# Patient Record
Sex: MALE | Employment: FULL TIME | URBAN - METROPOLITAN AREA
[De-identification: names, ages, dates, MRNs, and addresses within clinical notes are randomized per-mention and may not be internally consistent; named-entity substitution may affect disease eponyms.]

---

## 2017-02-22 ENCOUNTER — OFFICE VISIT (OUTPATIENT)
Dept: FAMILY MEDICINE | Facility: CLINIC | Age: 62
End: 2017-02-22
Payer: OTHER GOVERNMENT

## 2017-02-22 VITALS
HEART RATE: 76 BPM | SYSTOLIC BLOOD PRESSURE: 111 MMHG | WEIGHT: 276 LBS | DIASTOLIC BLOOD PRESSURE: 79 MMHG | RESPIRATION RATE: 16 BRPM | HEIGHT: 72 IN | OXYGEN SATURATION: 95 % | TEMPERATURE: 98 F | BODY MASS INDEX: 37.38 KG/M2

## 2017-02-22 DIAGNOSIS — I10 BENIGN ESSENTIAL HYPERTENSION: ICD-10-CM

## 2017-02-22 DIAGNOSIS — E06.3 HYPOTHYROIDISM DUE TO HASHIMOTO'S THYROIDITIS: Primary | ICD-10-CM

## 2017-02-22 DIAGNOSIS — Z11.59 NEED FOR HEPATITIS C SCREENING TEST: ICD-10-CM

## 2017-02-22 LAB
ANION GAP SERPL CALCULATED.3IONS-SCNC: 5 MMOL/L (ref 3–14)
BUN SERPL-MCNC: 14 MG/DL (ref 7–30)
CALCIUM SERPL-MCNC: 8.8 MG/DL (ref 8.5–10.1)
CHLORIDE SERPL-SCNC: 107 MMOL/L (ref 94–109)
CHOLEST SERPL-MCNC: 177 MG/DL
CO2 SERPL-SCNC: 28 MMOL/L (ref 20–32)
CREAT SERPL-MCNC: 1.03 MG/DL (ref 0.66–1.25)
GFR SERPL CREATININE-BSD FRML MDRD: 74 ML/MIN/1.7M2
GLUCOSE SERPL-MCNC: 97 MG/DL (ref 70–99)
HCV AB SERPL QL IA: NORMAL
HDLC SERPL-MCNC: 40 MG/DL
LDLC SERPL CALC-MCNC: 121 MG/DL
NONHDLC SERPL-MCNC: 137 MG/DL
POTASSIUM SERPL-SCNC: 4.3 MMOL/L (ref 3.4–5.3)
SODIUM SERPL-SCNC: 140 MMOL/L (ref 133–144)
TRIGL SERPL-MCNC: 78 MG/DL
TSH SERPL DL<=0.05 MIU/L-ACNC: 0.7 MU/L (ref 0.4–4)

## 2017-02-22 PROCEDURE — 86803 HEPATITIS C AB TEST: CPT | Performed by: FAMILY MEDICINE

## 2017-02-22 PROCEDURE — 84443 ASSAY THYROID STIM HORMONE: CPT | Performed by: FAMILY MEDICINE

## 2017-02-22 PROCEDURE — 80048 BASIC METABOLIC PNL TOTAL CA: CPT | Performed by: FAMILY MEDICINE

## 2017-02-22 PROCEDURE — 99214 OFFICE O/P EST MOD 30 MIN: CPT | Performed by: FAMILY MEDICINE

## 2017-02-22 PROCEDURE — 80061 LIPID PANEL: CPT | Performed by: FAMILY MEDICINE

## 2017-02-22 PROCEDURE — 36415 COLL VENOUS BLD VENIPUNCTURE: CPT | Performed by: FAMILY MEDICINE

## 2017-02-22 RX ORDER — NIFEDIPINE 60 MG/1
60 TABLET, EXTENDED RELEASE ORAL DAILY
Qty: 90 TABLET | Refills: 3 | Status: SHIPPED | OUTPATIENT
Start: 2017-02-22 | End: 2017-03-28

## 2017-02-22 RX ORDER — LEVOTHYROXINE SODIUM 137 UG/1
1 CAPSULE ORAL DAILY
Qty: 90 CAPSULE | Refills: 3 | Status: SHIPPED | OUTPATIENT
Start: 2017-02-22 | End: 2017-03-25

## 2017-02-22 NOTE — NURSING NOTE
Chief Complaint   Patient presents with     Recheck Medication     Levothyroxine     Refill Request     Initial /79 (BP Location: Right arm, Patient Position: Chair, Cuff Size: Adult Large)  Pulse 76  Temp 98  F (36.7  C) (Oral)  Resp 16  Ht 6' (1.829 m)  Wt 276 lb (125.2 kg)  SpO2 95%  BMI 37.43 kg/m2 Estimated body mass index is 37.43 kg/(m^2) as calculated from the following:    Height as of this encounter: 6' (1.829 m).    Weight as of this encounter: 276 lb (125.2 kg)..  BP completed using cuff size large  Kelsi Villanueva, CMA

## 2017-02-22 NOTE — MR AVS SNAPSHOT
After Visit Summary   2/22/2017    Simba Fuentes    MRN: [id number]           Patient Information     Date Of Birth          1955        Visit Information        Provider Department      2/22/2017 8:15 AM Kvng Malhotra MD [facility]        Today's Diagnoses     Hypothyroidism due to Hashimoto's thyroiditis    -  1    Benign essential hypertension        Need for hepatitis C screening test           Follow-ups after your visit        Your next 10 appointments already scheduled     Jun 08, 2017 10:00 AM CDT   CT ABDOMEN PELVIS W CONTRAST with [id number]   [facility] ([facility])    [address]  [facility] [id number] [phone]           Please bring any scans or X-rays taken at other hospitals, if similar tests were done. Also bring a list of your medicines, including vitamins, minerals and over-the-counter drugs. It is safest to leave personal items at home.  Be sure to tell your doctor:   If you have any allergies.   If there s any chance you are pregnant.   If you are breastfeeding.   If you have any special needs.  You may have contrast for this exam. To prepare:   Do not eat or drink for 2 hours before your exam. If you need to take medicine, you may take it with small sips of water. (We may ask you to take liquid medicine as well.)   The day before your exam, drink extra fluids at least six 8-ounce glasses (unless your doctor tells you to restrict your fluids).  Patients over 70 or patients with diabetes or kidney problems:   If you haven t had a blood test (creatinine test) within the last 30 days, go to your clinic or Diagnostic Imaging Department for this test.  If you have diabetes:   If your kidney function is normal, continue taking your metformin (Avandamet, Glucophage, Glucovance, Metaglip) on the day of your exam.   If your kidney function is abnormal, wait 48 hours before restarting this  medicine.  You will have oral contrast for this exam:   You will drink the contrast at home. Get this from your clinic or Diagnostic Imaging Department. Please follow the directions given.  Please wear loose clothing, such as a sweat suit or jogging clothes. Avoid snaps, zippers and other metal. We may ask you to undress and put on a hospital gown.  If you have any questions, please call the Imaging Department where you will have your exam.            Jun 08, 2017 10:30 AM CDT   CT CHEST W CONTRAST with [address] [facility] ([facility])    [address]  [place] [address] [phone]           Please bring any scans or X-rays taken at other hospitals, if similar tests were done. Also bring a list of your medicines, including vitamins, minerals and over-the-counter drugs. It is safest to leave personal items at home.  Be sure to tell your doctor:   If you have any allergies.   If there s any chance you are pregnant.   If you are breastfeeding.   If you have any special needs.  You will have contrast for this exam. To prepare:   Do not eat or drink for 2 hours before your exam. If you need to take medicine, you may take it with small sips of water. (We may ask you to take liquid medicine as well.)   The day before your exam, drink extra fluids at least six 8-ounce glasses (unless your doctor tells you to restrict your fluids).  Patients over 70 or patients with diabetes or kidney problems:   If you haven t had a blood test (creatinine test) within the last 30 days, go to your clinic or Diagnostic Imaging Department for this test.  If you have diabetes:   If your kidney function is normal, continue taking your metformin (Avandamet, Glucophage, Glucovance, Metaglip) on the day of your exam.   If your kidney function is abnormal, wait 48 hours before restarting this medicine.  Please wear loose clothing, such as a sweat suit or jogging clothes.  "Avoid snaps, zippers and other metal. We may ask you to undress and put on a hospital gown.  If you have any questions, please call the Imaging Department where you will have your exam.              Who to contact     If you have questions or need follow up information about today's clinic visit or your schedule please contact [facility] directly at [phone].  Normal or non-critical lab and imaging results will be communicated to you by MyChart, letter or phone within 4 business days after the clinic has received the results. If you do not hear from us within 7 days, please contact the clinic through [employer] or phone. If you have a critical or abnormal lab result, we will notify you by phone as soon as possible.  Submit refill requests through [employer] or call your pharmacy and they will forward the refill request to us. Please allow 3 business days for your refill to be completed.          Additional Information About Your Visit        [employer] Information     [employer] lets you send messages to your doctor, view your test results, renew your prescriptions, schedule appointments and more. To sign up, go to [email or web address] . Click on \"Log in\" on the left side of the screen, which will take you to the Welcome page. Then click on \"Sign up Now\" on the right side of the page.     You will be asked to enter the access code listed below, as well as some personal information. Please follow the directions to create your username and password.     Your access code is: [email or web address]  Expires: 2017  8:51 AM     Your access code will  in 90 days. If you need help or a new code, please call your [place] clinic or [phone].        Care EveryWhere ID     This is your Care EveryWhere ID. This could be used by other organizations to access your [place] medical records  [id number]        Your Vitals Were     Pulse Temperature Respirations Height Pulse Oximetry BMI (Body Mass Index)    76 " 98  F (36.7  C) (Oral) 16 6' (1.829 m) 95% 37.43 kg/m2       Blood Pressure from Last 3 Encounters:   02/22/17 111/79   05/16/16 107/77   04/29/16 117/89    Weight from Last 3 Encounters:   02/22/17 276 lb (125.2 kg)   04/29/16 282 lb (127.9 kg)   07/28/15 272 lb 14.4 oz (123.8 kg)              We Performed the Following     Basic metabolic panel     Hepatitis C Screen Reflex to HCV RNA Quant and Genotype     Lipid Profile with reflex to direct LDL     TSH          Where to get your medicines      These medications were sent to [place] [address] [facility] - [place], [place] - [address]  [address], [facility] [address]     Phone:  [phone]     Levothyroxine Sodium 137 MCG Caps          Primary Care Provider Office Phone # Fax #    Kvng Malhotra [phone]       [facility] [id number] [place] [id number]        Thank you!     Thank you for choosing [facility]  for your care. Our goal is always to provide you with excellent care. Hearing back from our patients is one way we can continue to improve our services. Please take a few minutes to complete the written survey that you may receive in the mail after your visit with us. Thank you!             Your Updated Medication List - Protect others around you: Learn how to safely use, store and throw away your medicines at www.disposemymeds.org.          This list is accurate as of: 2/22/17  8:51 AM.  Always use your most recent med list.                   Brand Name Dispense Instructions for use    FISH OIL          Levothyroxine Sodium 137 MCG Caps     90 capsule    Take 1 tablet by mouth daily       NIFEdipine ER osmotic 60 MG Tb24    PROCARDIA XL    90 tablet    Take 1 tablet (60 mg) by mouth daily

## 2017-02-22 NOTE — PROGRESS NOTES
SUBJECTIVE:                                                    Simba Fuentes is a 61 year old male who presents to clinic today for the following health issues:      Hypothyroidism Follow-up      Since last visit, patient describes the following symptoms: Weight stable, no hair loss, no skin changes, no constipation, no loose stools       Amount of exercise or physical activity: 4-5 days/week for an average of greater than 60 minutes    Problems taking medications regularly: No    Medication side effects: none  Diet: regular (no restrictions)    Hypertension Follow-up      Outpatient blood pressures are not being checked.    Low Salt Diet: no added salt       Problem list and histories reviewed & adjusted, as indicated.  Additional history: as documented    Patient Active Problem List   Diagnosis     CARDIOVASCULAR SCREENING; LDL GOAL LESS THAN 160     Advanced directives, counseling/discussion     Seborrheic keratosis     Obesity     Nephrolithiasis     Ascending aorta enlargement (H)     Thyroid nodule     Seasonal allergic rhinitis     High grade B-cell lymphoma (H)     Renal cyst     Appendicitis     Hypothyroidism due to Hashimoto's thyroiditis     Benign essential hypertension     Past Surgical History   Procedure Laterality Date     Tonsillectomy & adenoidectomy  age 12      Hemrrhoid  1994     Insert port vascular access  11/18/2013     Procedure: INSERT PORT VASCULAR ACCESS;  Bilateral Hip bone marrow biopsies, Left thyroid biopsy, pretracheal lymph node biopsy,  Insertion port-a-cath left chest;  Surgeon: Lucila Tian MD;  Location: [facility] OR     Bone marrow biopsy, bone specimen, needle/trocar  11/18/2013     Procedure: BIOPSY BONE MARROW;;  Surgeon: Arthur Henry MD;  Location: [facility] OR     Thyroidectomy  11/18/2013     Procedure: THYROIDECTOMY;;  Surgeon: Lucila Tian MD;  Location: [facility] OR     Laparoscopic appendectomy N/A 7/28/2015     Procedure: LAPAROSCOPIC APPENDECTOMY;  Surgeon:  Lucila Tian MD;  Location: [facility] OR     Remove port vascular access Left 7/28/2015     Procedure: REMOVE PORT VASCULAR ACCESS;  Surgeon: Lucila Tian MD;  Location: [facility] OR     Abdomen surgery       Biopsy         Social History   Substance Use Topics     Smoking status: Never Smoker     Smokeless tobacco: Never Used     Alcohol use No     Family History   Problem Relation Age of Onset     CEREBROVASCULAR DISEASE Father      Hypertension Father      GASTROINTESTINAL DISEASE Mother      liver issues     Gallbladder Disease Mother      Eye Disorder Sister      Gallbladder Disease Maternal Grandmother      Gallbladder Disease Sister          Current Outpatient Prescriptions   Medication Sig Dispense Refill     Levothyroxine Sodium 137 MCG CAPS Take 1 tablet by mouth daily 90 capsule 3     NIFEdipine ER osmotic (PROCARDIA XL) 60 MG TB24 Take 1 tablet (60 mg) by mouth daily 90 tablet 3     FISH OIL        [DISCONTINUED] NIFEdipine ER osmotic (PROCARDIA XL) 60 MG TB24 Take 1 tablet (60 mg) by mouth daily 90 tablet 3     [DISCONTINUED] Levothyroxine Sodium 137 MCG CAPS Take 1 tablet by mouth daily        Allergies   Allergen Reactions     Chlorhexidine Rash     Chloraprep - red itchy rash.  Patient report.       ROS:  C: NEGATIVE for fever, chills, change in weight  CV: NEGATIVE for chest pain, palpitations or peripheral edema    OBJECTIVE:                                                    /79 (BP Location: Right arm, Patient Position: Chair, Cuff Size: Adult Large)  Pulse 76  Temp 98  F (36.7  C) (Oral)  Resp 16  Ht 6' (1.829 m)  Wt 276 lb (125.2 kg)  SpO2 95%  BMI 37.43 kg/m2  Body mass index is 37.43 kg/(m^2).  GENERAL: healthy, alert and no distress  NECK: no adenopathy, no asymmetry, masses, or scars and thyroid normal to palpation  RESP: lungs clear to auscultation - no rales, rhonchi or wheezes  CV: regular rate and rhythm, normal S1 S2, no S3 or S4, no murmur, click or rub, no peripheral edema and  peripheral pulses strong  ABDOMEN: soft, nontender, no hepatosplenomegaly, no masses and bowel sounds normal  MS: no gross musculoskeletal defects noted, no edema         ASSESSMENT/PLAN:                                                            1. Hypothyroidism due to Hashimoto's thyroiditis  Well controlled, check labs  - Levothyroxine Sodium 137 MCG CAPS; Take 1 tablet by mouth daily  Dispense: 90 capsule; Refill: 3  - TSH    2. Benign essential hypertension  Controlled, talked about weight loss.  - Basic metabolic panel  - Lipid Profile with reflex to direct LDL  - NIFEdipine ER osmotic (PROCARDIA XL) 60 MG TB24; Take 1 tablet (60 mg) by mouth daily  Dispense: 90 tablet; Refill: 3    3. Need for hepatitis C screening test    - Hepatitis C Screen Reflex to HCV RNA Quant and Genotype    FUTURE APPOINTMENTS:       - Follow-up visit in 1 year.,    Kvng Malhotra MD  [facility]

## 2017-02-23 ENCOUNTER — TELEPHONE (OUTPATIENT)
Dept: FAMILY MEDICINE | Facility: CLINIC | Age: 62
End: 2017-02-23

## 2017-02-23 NOTE — TELEPHONE ENCOUNTER
Message given to pt  Pt would prefer not to start medication for lipids  Discussed heart risk, diet and increasing exercise, encouraged 20-30 min exercise 5 days a week  Kalyani Moreno RN, BS  Clinical Nurse Triage .

## 2017-02-23 NOTE — TELEPHONE ENCOUNTER
Given his age, HTN and cholesterol, it is recommended to be on statin drugs like zocor 40 or lipitor 40.  Is he willing to do that?  Heart risk is 8.5%.  Kvng Malhotra MD  [facility]  [phone]

## 2017-03-25 DIAGNOSIS — I10 BENIGN ESSENTIAL HYPERTENSION: ICD-10-CM

## 2017-03-25 DIAGNOSIS — E06.3 HYPOTHYROIDISM DUE TO HASHIMOTO'S THYROIDITIS: ICD-10-CM

## 2017-03-28 RX ORDER — NIFEDIPINE 60 MG/1
60 TABLET, EXTENDED RELEASE ORAL DAILY
Qty: 90 TABLET | Refills: 3 | Status: SHIPPED | OUTPATIENT
Start: 2017-03-28 | End: 2018-04-04

## 2017-03-28 RX ORDER — LEVOTHYROXINE SODIUM 137 UG/1
1 CAPSULE ORAL DAILY
Qty: 90 CAPSULE | Refills: 3 | Status: SHIPPED | OUTPATIENT
Start: 2017-03-28 | End: 2018-04-04

## 2017-03-28 NOTE — TELEPHONE ENCOUNTER
Pt calls, requests these prescriptions transferred to [facility] mail order pharmacy.   Signed Prescriptions:                        Disp   Refills    Levothyroxine Sodium 137 MCG CAPS          90 cap*3        Sig: Take 1 tablet by mouth daily  Authorizing Provider: VAMSHI, AMER  Ordering User: ROSEMARIE YU    NIFEdipine ER osmotic (PROCARDIA XL) 60 MG*90 tab*3        Sig: Take 1 tablet (60 mg) by mouth daily  Authorizing Provider: VAMSHI, AMER  Ordering User: ROSEMARIE YU    Informed done.   Rosemarie Yu, LIZ

## 2017-03-30 ENCOUNTER — TELEPHONE (OUTPATIENT)
Dept: CARDIOLOGY | Facility: CLINIC | Age: 62
End: 2017-03-30

## 2017-03-30 DIAGNOSIS — I71.21 ASCENDING AORTIC ANEURYSM (H): Primary | ICD-10-CM

## 2017-03-30 NOTE — TELEPHONE ENCOUNTER
Pt called and left a vm stating that he has scheduled a follow up with Dr. Gayle in June. Pt states that it was recommended that he get a CT scan of chest, abdomen, and pelvis done prior to this OV. Pt states that he does not want to do this CT scan because of the radiation exposure. Pt was wondering if Dr. Gayle would like to order an ultrasound instead to follow up    Chart reviewed: 5/16/16 OV with Dr. Gayle     1. Ascending aortic aneurysm, 4.8 cm    Continue to follow. Patient will be getting another CT scan for his non-Hodgkin's lipoma in approximately one year.    Would consider changing his nifedipine to either a beta blocker or a combination of beta blocker and ACE inhibitor if any further growth. To my measurement, I do not feel there has been significant enlargement in the past three years going from 4.7 cm to 4.8 cm when reviewing the images directly.     Follow-up in one year, Tesfaye with me.    Writer will route to Dr. Gayle to review

## 2017-03-31 NOTE — TELEPHONE ENCOUNTER
Do we know who ordered the CT? If I ordered it, we can do an echo instead to check the aorta. If it was ordered by his oncologist to follow his lymphoma, he should check with them before canceling it.

## 2017-03-31 NOTE — TELEPHONE ENCOUNTER
Spoke with patient. Informed him that his oncologist ordered the CT scan.  He stated he knew but he is cancelling the CT with his oncologist but he wanted to make sure Dr. Gayle was aware due to the Ascending aortic aneurysm.  He stated he was told at his last OV that if he has a CT Dr. Gayle can see the aorta then but if he does not have the CT Dr. Gayle would order an echo. Patient stated he would like to have the echo completed. Order placed and sent to scheduling to schedule.

## 2017-04-04 ENCOUNTER — TELEPHONE (OUTPATIENT)
Dept: FAMILY MEDICINE | Facility: CLINIC | Age: 62
End: 2017-04-04

## 2017-04-04 NOTE — TELEPHONE ENCOUNTER
Recd 1 page fax from [employer].  Pleaes sign form and fax to [phone].  Form in AA folder at [place].    Yanna Root

## 2017-04-26 ENCOUNTER — HOSPITAL ENCOUNTER (OUTPATIENT)
Dept: CARDIOLOGY | Facility: CLINIC | Age: 62
Discharge: HOME | End: 2017-04-26
Attending: INTERNAL MEDICINE | Admitting: INTERNAL MEDICINE
Payer: OTHER GOVERNMENT

## 2017-04-26 DIAGNOSIS — I71.21 ASCENDING AORTIC ANEURYSM (H): ICD-10-CM

## 2017-04-26 PROCEDURE — 93306 TTE W/DOPPLER COMPLETE: CPT | Mod: 26 | Performed by: INTERNAL MEDICINE

## 2017-04-26 PROCEDURE — 93306 TTE W/DOPPLER COMPLETE: CPT

## 2017-05-02 ENCOUNTER — TELEPHONE (OUTPATIENT)
Dept: CARDIOLOGY | Facility: CLINIC | Age: 62
End: 2017-05-02

## 2017-05-02 NOTE — TELEPHONE ENCOUNTER
"Called patient to update him of the that Dr. Gayle reviewed the echo \"The echo measurements of the aortic root and ascending aorta are pretty stable and still in the moderate range. Will discuss further at OV in June.\" patient pleased and he will present in June to the office visit.   "

## 2017-06-12 ENCOUNTER — TRANSFERRED RECORDS (OUTPATIENT)
Dept: CARDIOLOGY | Facility: CLINIC | Age: 62
End: 2017-06-12

## 2017-06-12 LAB
ALBUMIN SERPL-MCNC: 4 G/DL
ALP SERPL-CCNC: 54 U/L
ALT SERPL-CCNC: 20 U/L
ANION GAP SERPL CALCULATED.3IONS-SCNC: NORMAL MMOL/L
AST SERPL-CCNC: 19 U/L
BILIRUB SERPL-MCNC: 0.5 MG/DL
BUN SERPL-MCNC: 17 MG/DL
CALCIUM SERPL-MCNC: 9.2 MG/DL
CHLORIDE SERPLBLD-SCNC: 105 MMOL/L
CO2 SERPL-SCNC: 23 MMOL/L
CREAT SERPL-MCNC: 1.1 MG/DL
ERYTHROCYTE [DISTWIDTH] IN BLOOD BY AUTOMATED COUNT: 13.9 %
GFR SERPL CREATININE-BSD FRML MDRD: >60 ML/MIN/1.73M2
GLUCOSE SERPL-MCNC: 87 MG/DL (ref 70–99)
HCT VFR BLD AUTO: 37.9 %
HEMOGLOBIN: 13.3 G/DL
MCH RBC QN AUTO: 31.3 PG
MCHC RBC AUTO-ENTMCNC: 35.1 G/DL
MCV RBC AUTO: 89.2 FL
PLATELET # BLD AUTO: 202 10^9/L
POTASSIUM SERPL-SCNC: 4 MMOL/L
PROT SERPL-MCNC: 7.5 G/DL
RBC # BLD AUTO: 4.25 10^12/L
SODIUM SERPL-SCNC: 138 MMOL/L
WBC # BLD AUTO: 5.1 10^9/L

## 2017-06-16 ENCOUNTER — TRANSFERRED RECORDS (OUTPATIENT)
Dept: CARDIOLOGY | Facility: CLINIC | Age: 62
End: 2017-06-16

## 2017-06-16 ENCOUNTER — TRANSFERRED RECORDS (OUTPATIENT)
Dept: SURGERY | Facility: CLINIC | Age: 62
End: 2017-06-16

## 2017-06-19 ENCOUNTER — TELEPHONE (OUTPATIENT)
Dept: CARDIOLOGY | Facility: CLINIC | Age: 62
End: 2017-06-19

## 2017-06-20 ENCOUNTER — OFFICE VISIT (OUTPATIENT)
Dept: CARDIOLOGY | Facility: CLINIC | Age: 62
End: 2017-06-20
Attending: INTERNAL MEDICINE
Payer: OTHER GOVERNMENT

## 2017-06-20 VITALS
WEIGHT: 267 LBS | HEIGHT: 72 IN | DIASTOLIC BLOOD PRESSURE: 77 MMHG | OXYGEN SATURATION: 97 % | SYSTOLIC BLOOD PRESSURE: 112 MMHG | BODY MASS INDEX: 36.16 KG/M2 | HEART RATE: 72 BPM

## 2017-06-20 DIAGNOSIS — I71.21 THORACIC ASCENDING AORTIC ANEURYSM (H): ICD-10-CM

## 2017-06-20 PROCEDURE — 99213 OFFICE O/P EST LOW 20 MIN: CPT | Performed by: INTERNAL MEDICINE

## 2017-06-20 NOTE — PROGRESS NOTES
Cardiology Progress Note          Assessment and Plan:     1. Ascending aortic aneurysm, stable at 4.6 cm on echocardiogram, 4.8 cm on CT.    Excellent blood pressure control.  It has been stable over the past few years.  Recheck echo in one year and follow up in clinic in two years.    This note was transcribed using electronic voice recognition software and there may be typographical errors present.                Interval History:   The patient is a very pleasant 61 year old patient of Dr. Malhotra and Dr. Ramirez.  He has non-Hodgkin's lymphoma that is currently stable after treatment.  He declined radiation and just had chemotherapy.  He did have previous tonsillar radiation approximately 45 years ago.  He currently has hypertension that is very well controlled on nifedipine.  He has not been on a beta blocker.     He has been followed for ascending aortic aneurysm in the setting of a normal trileaflet aortic valve.     He gets frequent CT scans due to his history of non-Hodgkin's lymphoma and I have reviewed the images of these tests.  His most recent scan on 5/2/2016 I had compared directly to a previous one in 2013.   On that scan, his ascending thoracic aorta was approximately 4.8 cm.  It is tubular rather than focal and appears very similar to 2013 where I measured it to be 4.7 cm at the same level.  He has had different measurements that may have been somewhat suboptimal including transthoracic echo that measured 4.5 cm around that time.    Most recent transthoracic echocardiogram in 2017 shows stability at 4.6 cm.  I have personally reviewed the images.      Patient denies any exertional chest discomfort or dyspnea on exertion.  He has an 18 month old granddaughter that he takes care of, but when he is not with her he does bicycle 10-15 miles per day about any dyspnea or chest discomfort.                       Review of Systems:   Review of Systems:  Skin:  Negative     Eyes:  Positive for glasses  ENT:   Negative    Respiratory:  Negative    Cardiovascular:  Negative    Gastroenterology: Negative    Genitourinary:  Negative    Musculoskeletal:  Negative    Neurologic:  Positive for numbness or tingling of feet  Psychiatric:  Negative    Heme/Lymph/Imm:  Positive for easy bruising  Endocrine:  Positive for thyroid disorder              Physical Exam:     Vitals: /77 (BP Location: Right arm, Patient Position: Sitting, Cuff Size: Adult Large)  Pulse 72  Ht 1.829 m (6')  Wt 121.1 kg (267 lb)  SpO2 97%  BMI 36.21 kg/m2  Constitutional:  cooperative, alert and oriented, well developed, well nourished, in no acute distress        Skin:  warm and dry to the touch, no apparent skin lesions or masses noted        Head:  normocephalic, no masses or lesions        Eyes:  pupils equal and round, conjunctivae and lids unremarkable, sclera white, no xanthalasma, EOMS intact, no nystagmus        ENT:  no pallor or cyanosis, dentition good        Neck:  JVP normal        Chest:  normal breath sounds, clear to auscultation, normal A-P diameter, normal symmetry, normal respiratory excursion, no use of accessory muscles        Cardiac: regular rhythm frequent premature beats                Abdomen:      benign    Extremities and Back:  no deformities, clubbing, cyanosis, erythema observed        Neurological:  affect appropriate, oriented to time, person and place;no gross motor deficits                 Medications:     Current Outpatient Prescriptions   Medication Sig Dispense Refill     Levothyroxine Sodium 137 MCG CAPS Take 1 tablet by mouth daily 90 capsule 3     NIFEdipine ER osmotic (PROCARDIA XL) 60 MG TB24 Take 1 tablet (60 mg) by mouth daily 90 tablet 3     FISH OIL                   Data:   All laboratory data reviewed  No results found for this or any previous visit (from the past 24 hour(s)).    All laboratory data reviewed  Lab Results   Component Value Date    CHOL 177 02/22/2017     Lab Results   Component  Value Date    HDL 40 02/22/2017     Lab Results   Component Value Date     02/22/2017     Lab Results   Component Value Date    TRIG 78 02/22/2017     Lab Results   Component Value Date    CHOLHDLRATIO 3.7 09/05/2014     TSH   Date Value Ref Range Status   02/22/2017 0.70 0.40 - 4.00 mU/L Final     Last Basic Metabolic Panel:  Lab Results   Component Value Date     02/22/2017      Lab Results   Component Value Date    POTASSIUM 4.3 02/22/2017     Lab Results   Component Value Date    CHLORIDE 107 02/22/2017     Lab Results   Component Value Date    MAXINE 8.8 02/22/2017     Lab Results   Component Value Date    CO2 28 02/22/2017     Lab Results   Component Value Date    BUN 14 02/22/2017     Lab Results   Component Value Date    CR 1.03 02/22/2017     Lab Results   Component Value Date    GLC 97 02/22/2017     Lab Results   Component Value Date    WBC 5.3 07/27/2015     Lab Results   Component Value Date    RBC 4.34 07/27/2015     Lab Results   Component Value Date    HGB 13.7 07/27/2015     Lab Results   Component Value Date    HCT 39.3 07/27/2015     Lab Results   Component Value Date    MCV 91 07/27/2015     Lab Results   Component Value Date    MCH 31.6 07/27/2015     Lab Results   Component Value Date    MCHC 34.9 07/27/2015     Lab Results   Component Value Date    RDW 14.0 07/27/2015     Lab Results   Component Value Date     07/27/2015

## 2017-06-20 NOTE — MR AVS SNAPSHOT
"              After Visit Summary   6/20/2017    Simba Fuentes    MRN: [id number]           Patient Information     Date Of Birth          1955        Visit Information        Provider Department      6/20/2017 8:45 AM Nikita Gayle MD [facility]        Today's Diagnoses     Thoracic ascending aortic aneurysm (H)           Follow-ups after your visit        Additional Services     Follow-Up with Cardiologist                 Future tests that were ordered for you today     Open Future Orders        Priority Expected Expires Ordered    Echocardiogram Routine 6/20/2019 7/10/2019 6/20/2017    Follow-Up with Cardiologist Routine 6/20/2019 7/10/2019 6/20/2017    Echocardiogram Routine 6/20/2018 7/25/2018 6/20/2017            Who to contact     If you have questions or need follow up information about today's clinic visit or your schedule please contact [facility] directly at [phone].  Normal or non-critical lab and imaging results will be communicated to you by MyChart, letter or phone within 4 business days after the clinic has received the results. If you do not hear from us within 7 days, please contact the clinic through [employer] or phone. If you have a critical or abnormal lab result, we will notify you by phone as soon as possible.  Submit refill requests through [employer] or call your pharmacy and they will forward the refill request to us. Please allow 3 business days for your refill to be completed.          Additional Information About Your Visit        [employer] Information     [employer]Tree lets you send messages to your doctor, view your test results, renew your prescriptions, schedule appointments and more. To sign up, go to [email or web address] . Click on \"Log in\" on the left side of the screen, which will take you to the Welcome page. Then click on \"Sign up Now\" on the right side of the page.     You will be " asked to enter the access code listed below, as well as some personal information. Please follow the directions to create your username and password.     Your access code is: [email or web address]  Expires: 2017  9:14 AM     Your access code will  in 90 days. If you need help or a new code, please call your [place] clinic or [phone].        Care EveryWhere ID     This is your Care EveryWhere ID. This could be used by other organizations to access your [place] medical records  [id number]        Your Vitals Were     Pulse Height Pulse Oximetry BMI (Body Mass Index)          72 1.829 m (6') 97% 36.21 kg/m2         Blood Pressure from Last 3 Encounters:   17 112/77   17 111/79   16 107/77    Weight from Last 3 Encounters:   17 121.1 kg (267 lb)   17 125.2 kg (276 lb)   16 127.9 kg (282 lb)              We Performed the Following     Follow-Up with Cardiologist        Primary Care Provider Office Phone # Fax #    Kvng Malhotra [phone]       [facility] [address] [facility] [address]        Thank you!     Thank you for choosing [facility] [place]  for your care. Our goal is always to provide you with excellent care. Hearing back from our patients is one way we can continue to improve our services. Please take a few minutes to complete the written survey that you may receive in the mail after your visit with us. Thank you!             Your Updated Medication List - Protect others around you: Learn how to safely use, store and throw away your medicines at www.disposemymeds.org.          This list is accurate as of: 17  9:14 AM.  Always use your most recent med list.                   Brand Name Dispense Instructions for use    FISH OIL          Levothyroxine Sodium 137 MCG Caps     90 capsule    Take 1 tablet by mouth daily       NIFEdipine ER osmotic 60 MG Tb24    PROCARDIA XL    90 tablet    Take 1  tablet (60 mg) by mouth daily

## 2017-06-20 NOTE — LETTER
6/20/2017    Kvng Malhotra MD  [facility]   [address]  [facility] [address]    RE: Simba Fuentes       Dear Colleague,    I had the pleasure of seeing Simba E Alfredo in the [facility].    Cardiology Progress Note          Assessment and Plan:     1. Ascending aortic aneurysm, stable at 4.6 cm on echocardiogram, 4.8 cm on CT.    Excellent blood pressure control.  It has been stable over the past few years.  Recheck echo in one year and follow up in clinic in two years.    This note was transcribed using electronic voice recognition software and there may be typographical errors present.                Interval History:   The patient is a very pleasant 61 year old patient of Dr. Malhotra and Dr. Ramirez.  He has non-Hodgkin's lymphoma that is currently stable after treatment.  He declined radiation and just had chemotherapy.  He did have previous tonsillar radiation approximately 45 years ago.  He currently has hypertension that is very well controlled on nifedipine.  He has not been on a beta blocker.     He has been followed for ascending aortic aneurysm in the setting of a normal trileaflet aortic valve.     He gets frequent CT scans due to his history of non-Hodgkin's lymphoma and I have reviewed the images of these tests.  His most recent scan on 5/2/2016 I had compared directly to a previous one in 2013.   On that scan, his ascending thoracic aorta was approximately 4.8 cm.  It is tubular rather than focal and appears very similar to 2013 where I measured it to be 4.7 cm at the same level.  He has had different measurements that may have been somewhat suboptimal including transthoracic echo that measured 4.5 cm around that time.    Most recent transthoracic echocardiogram in 2017 shows stability at 4.6 cm.  I have personally reviewed the images.      Patient denies any exertional chest discomfort or dyspnea on exertion.  He has an 18 month old granddaughter that he  takes care of, but when he is not with her he does bicycle 10-15 miles per day about any dyspnea or chest discomfort.                       Review of Systems:   Review of Systems:  Skin:  Negative     Eyes:  Positive for glasses  ENT:  Negative    Respiratory:  Negative    Cardiovascular:  Negative    Gastroenterology: Negative    Genitourinary:  Negative    Musculoskeletal:  Negative    Neurologic:  Positive for numbness or tingling of feet  Psychiatric:  Negative    Heme/Lymph/Imm:  Positive for easy bruising  Endocrine:  Positive for thyroid disorder              Physical Exam:     Vitals: /77 (BP Location: Right arm, Patient Position: Sitting, Cuff Size: Adult Large)  Pulse 72  Ht 1.829 m (6')  Wt 121.1 kg (267 lb)  SpO2 97%  BMI 36.21 kg/m2  Constitutional:  cooperative, alert and oriented, well developed, well nourished, in no acute distress        Skin:  warm and dry to the touch, no apparent skin lesions or masses noted        Head:  normocephalic, no masses or lesions        Eyes:  pupils equal and round, conjunctivae and lids unremarkable, sclera white, no xanthalasma, EOMS intact, no nystagmus        ENT:  no pallor or cyanosis, dentition good        Neck:  JVP normal        Chest:  normal breath sounds, clear to auscultation, normal A-P diameter, normal symmetry, normal respiratory excursion, no use of accessory muscles        Cardiac: regular rhythm frequent premature beats                Abdomen:      benign    Extremities and Back:  no deformities, clubbing, cyanosis, erythema observed        Neurological:  affect appropriate, oriented to time, person and place;no gross motor deficits                 Medications:     Current Outpatient Prescriptions   Medication Sig Dispense Refill     Levothyroxine Sodium 137 MCG CAPS Take 1 tablet by mouth daily 90 capsule 3     NIFEdipine ER osmotic (PROCARDIA XL) 60 MG TB24 Take 1 tablet (60 mg) by mouth daily 90 tablet 3     FISH OIL                    Data:   All laboratory data reviewed  No results found for this or any previous visit (from the past 24 hour(s)).    All laboratory data reviewed  Lab Results   Component Value Date    CHOL 177 02/22/2017     Lab Results   Component Value Date    HDL 40 02/22/2017     Lab Results   Component Value Date     02/22/2017     Lab Results   Component Value Date    TRIG 78 02/22/2017     Lab Results   Component Value Date    CHOLHDLRATIO 3.7 09/05/2014     TSH   Date Value Ref Range Status   02/22/2017 0.70 0.40 - 4.00 mU/L Final     Last Basic Metabolic Panel:  Lab Results   Component Value Date     02/22/2017      Lab Results   Component Value Date    POTASSIUM 4.3 02/22/2017     Lab Results   Component Value Date    CHLORIDE 107 02/22/2017     Lab Results   Component Value Date    MAXINE 8.8 02/22/2017     Lab Results   Component Value Date    CO2 28 02/22/2017     Lab Results   Component Value Date    BUN 14 02/22/2017     Lab Results   Component Value Date    CR 1.03 02/22/2017     Lab Results   Component Value Date    GLC 97 02/22/2017     Lab Results   Component Value Date    WBC 5.3 07/27/2015     Lab Results   Component Value Date    RBC 4.34 07/27/2015     Lab Results   Component Value Date    HGB 13.7 07/27/2015     Lab Results   Component Value Date    HCT 39.3 07/27/2015     Lab Results   Component Value Date    MCV 91 07/27/2015     Lab Results   Component Value Date    MCH 31.6 07/27/2015     Lab Results   Component Value Date    MCHC 34.9 07/27/2015     Lab Results   Component Value Date    RDW 14.0 07/27/2015     Lab Results   Component Value Date     07/27/2015     Thank you for allowing me to participate in the care of your patient.    Sincerely,     Nikita Gayle MD     [facility]

## 2017-06-21 ENCOUNTER — TELEPHONE (OUTPATIENT)
Dept: FAMILY MEDICINE | Facility: CLINIC | Age: 62
End: 2017-06-21

## 2017-06-21 DIAGNOSIS — Z12.11 COLON CANCER SCREENING: Primary | ICD-10-CM

## 2017-06-21 NOTE — TELEPHONE ENCOUNTER
Panel Management Review      Patient has the following on his problem list:     Hypertension   Last three blood pressure readings:  BP Readings from Last 3 Encounters:   06/20/17 112/77   02/22/17 111/79   05/16/16 107/77     Blood pressure: Passed    HTN Guidelines:  Age 18-59 BP range:  Less than 140/90  Age 60-85 with Diabetes:  Less than 140/90  Age 60-85 without Diabetes:  less than 150/90      Composite cancer screening  Chart review shows that this patient is due/due soon for the following Fecal Colorectal (FIT)  Summary:    Patient is due/failing the following:   FIT    Action needed:   Patient needs referral/order: FIT Test    Type of outreach:    Phone, spoke to patient.  Patient will  FIT test     Questions for provider review:    None                                                                                                                                    Emily Aguilar MA       Chart routed to no one  .

## 2017-06-27 DIAGNOSIS — I77.89 ASCENDING AORTA ENLARGEMENT (H): Primary | ICD-10-CM

## 2017-07-28 DIAGNOSIS — Z12.11 SPECIAL SCREENING FOR MALIGNANT NEOPLASMS, COLON: Primary | ICD-10-CM

## 2018-01-19 ENCOUNTER — TRANSFERRED RECORDS (OUTPATIENT)
Dept: HEALTH INFORMATION MANAGEMENT | Facility: CLINIC | Age: 63
End: 2018-01-19

## 2018-04-04 ENCOUNTER — OFFICE VISIT (OUTPATIENT)
Dept: FAMILY MEDICINE | Facility: CLINIC | Age: 63
End: 2018-04-04
Payer: OTHER GOVERNMENT

## 2018-04-04 DIAGNOSIS — Z12.11 SPECIAL SCREENING FOR MALIGNANT NEOPLASMS, COLON: Primary | ICD-10-CM

## 2018-04-04 DIAGNOSIS — E06.3 HYPOTHYROIDISM DUE TO HASHIMOTO'S THYROIDITIS: ICD-10-CM

## 2018-04-04 DIAGNOSIS — B36.0 TINEA VERSICOLOR: ICD-10-CM

## 2018-04-04 DIAGNOSIS — I10 BENIGN ESSENTIAL HYPERTENSION: ICD-10-CM

## 2018-04-04 LAB
CHOLEST SERPL-MCNC: 194 MG/DL
HDLC SERPL-MCNC: 42 MG/DL
LDLC SERPL CALC-MCNC: 132 MG/DL
NONHDLC SERPL-MCNC: 152 MG/DL
TRIGL SERPL-MCNC: 99 MG/DL
TSH SERPL DL<=0.005 MIU/L-ACNC: 0.49 MU/L (ref 0.4–4)

## 2018-04-04 PROCEDURE — 84443 ASSAY THYROID STIM HORMONE: CPT | Performed by: FAMILY MEDICINE

## 2018-04-04 PROCEDURE — 36415 COLL VENOUS BLD VENIPUNCTURE: CPT | Performed by: FAMILY MEDICINE

## 2018-04-04 PROCEDURE — 99214 OFFICE O/P EST MOD 30 MIN: CPT | Performed by: FAMILY MEDICINE

## 2018-04-04 PROCEDURE — 80061 LIPID PANEL: CPT | Performed by: FAMILY MEDICINE

## 2018-04-04 RX ORDER — LEVOTHYROXINE SODIUM 137 UG/1
1 CAPSULE ORAL DAILY
Qty: 90 CAPSULE | Refills: 3 | Status: SHIPPED | OUTPATIENT
Start: 2018-04-04 | End: 2019-04-28

## 2018-04-04 RX ORDER — NIFEDIPINE 60 MG/1
60 TABLET, EXTENDED RELEASE ORAL DAILY
Qty: 90 TABLET | Refills: 3 | Status: SHIPPED | OUTPATIENT
Start: 2018-04-04 | End: 2019-04-02

## 2018-04-04 RX ORDER — KETOCONAZOLE 20 MG/G
CREAM TOPICAL 2 TIMES DAILY
Qty: 60 G | Refills: 3 | Status: SHIPPED | OUTPATIENT
Start: 2018-04-04 | End: 2020-12-11

## 2018-04-04 NOTE — MR AVS SNAPSHOT
"              After Visit Summary   4/4/2018    Simba Fuentes    MRN: [id number]           Patient Information     Date Of Birth          1955        Visit Information        Provider Department      4/4/2018 10:00 AM Kvng Malhotra MD [facility]        Today's Diagnoses     Special screening for malignant neoplasms, colon    -  1    Hypothyroidism due to Hashimoto's thyroiditis        Benign essential hypertension           Follow-ups after your visit        Future tests that were ordered for you today     Open Future Orders        Priority Expected Expires Ordered    Fecal colorectal cancer screen (FIT) Routine 4/25/2018 6/27/2018 4/4/2018            Who to contact     If you have questions or need follow up information about today's clinic visit or your schedule please contact [facility] directly at [phone].  Normal or non-critical lab and imaging results will be communicated to you by MyChart, letter or phone within 4 business days after the clinic has received the results. If you do not hear from us within 7 days, please contact the clinic through MyChart or phone. If you have a critical or abnormal lab result, we will notify you by phone as soon as possible.  Submit refill requests through [employer] or call your pharmacy and they will forward the refill request to us. Please allow 3 business days for your refill to be completed.          Additional Information About Your Visit        MyChart Information     [employer] lets you send messages to your doctor, view your test results, renew your prescriptions, schedule appointments and more. To sign up, go to [email or web address] . Click on \"Log in\" on the left side of the screen, which will take you to the Welcome page. Then click on \"Sign up Now\" on the right side of the page.     You will be asked to enter the access code listed below, as well as some personal information. Please follow the directions to create your " username and password.     Your access code is: [email or web address]  Expires: 7/3/2018 10:37 AM     Your access code will  in 90 days. If you need help or a new code, please call your [place] clinic or [phone].        Care EveryWhere ID     This is your Care EveryWhere ID. This could be used by other organizations to access your [place] medical records  [id number]         Blood Pressure from Last 3 Encounters:   17 112/77   17 111/79   16 107/77    Weight from Last 3 Encounters:   17 267 lb (121.1 kg)   17 276 lb (125.2 kg)   16 282 lb (127.9 kg)              We Performed the Following     Lipid panel reflex to direct LDL Non-fasting     TSH          Where to get your medicines      These medications were sent to [facility] - [place], [place] - [address] AT [address] [place] [address] [facility] [address]     Phone:  [phone]     Levothyroxine Sodium 137 MCG Caps    NIFEdipine ER osmotic 60 MG Tb24          Primary Care Provider Office Phone # Fax #    Kvng Malhotra [phone] [address] [facility] [address]        Equal Access to Services     NETO ALEXANDRE : Hadii aad ku hadasho Soomaali, waaxda luqadaha, qaybta kaalmada tyron, evelyn orozco. So [facility] [phone].    ATENCIÓN: Si habla español, tiene a rodriguez disposición servicios gratuitos de asistencia lingüística. Llame al [phone].    We comply with applicable federal civil rights laws and [place] laws. We do not discriminate on the basis of race, color, national origin, age, disability, sex, sexual orientation, or gender identity.            Thank you!     Thank you for choosing [facility]  for your care. Our goal is always to provide you with excellent care. Hearing back from our patients is one way we can continue to improve our services. Please take a few minutes to complete the  written survey that you may receive in the mail after your visit with us. Thank you!             Your Updated Medication List - Protect others around you: Learn how to safely use, store and throw away your medicines at www.disposemymeds.org.          This list is accurate as of 4/4/18 10:37 AM.  Always use your most recent med list.                   Brand Name Dispense Instructions for use Diagnosis    FISH OIL           Levothyroxine Sodium 137 MCG Caps     90 capsule    Take 1 tablet by mouth daily    Hypothyroidism due to Hashimoto's thyroiditis       NIFEdipine ER osmotic 60 MG Tb24    PROCARDIA XL    90 tablet    Take 1 tablet (60 mg) by mouth daily    Benign essential hypertension

## 2018-04-04 NOTE — PROGRESS NOTES
SUBJECTIVE:   Simba Fuentes is a 62 year old male who presents to clinic today for the following health issues:      Hypertension Follow-up      Outpatient blood pressures are being checked at other DrJose appts.  Results are usually in range.    Low Salt Diet: low salt    Hypothyroidism Follow-up      Since last visit, patient describes the following symptoms: Weight stable, no hair loss, no skin changes, no constipation, no loose stools      Amount of exercise or physical activity: active with grandchild 5days per week    Problems taking medications regularly: No    Medication side effects: tissue build up in between teeth from nifedipine    Diet: low salt and low fat/cholesterol (trying)        Pt was diagnsoed with ascending aorta aneurysm, pt is following up with Cardiology, and recommended to follow up wtihin 1 year to 18 months.    Problem list and histories reviewed & adjusted, as indicated.  Additional history: as documented    Patient Active Problem List   Diagnosis     CARDIOVASCULAR SCREENING; LDL GOAL LESS THAN 160     Advanced directives, counseling/discussion     Seborrheic keratosis     Obesity     Nephrolithiasis     Ascending aorta enlargement (H)     Thyroid nodule     Seasonal allergic rhinitis     High grade B-cell lymphoma (H)     Renal cyst     Appendicitis     Hypothyroidism due to Hashimoto's thyroiditis     Benign essential hypertension     Past Surgical History:   Procedure Laterality Date     ABDOMEN SURGERY       BIOPSY       BONE MARROW BIOPSY, BONE SPECIMEN, NEEDLE/TROCAR  11/18/2013    Procedure: BIOPSY BONE MARROW;;  Surgeon: Arthur Henry MD;  Location:  OR     hemrrhoid  1994     INSERT PORT VASCULAR ACCESS  11/18/2013    Procedure: INSERT PORT VASCULAR ACCESS;  Bilateral Hip bone marrow biopsies, Left thyroid biopsy, pretracheal lymph node biopsy,  Insertion port-a-cath left chest;  Surgeon: Lucila Tian MD;  Location:  OR     LAPAROSCOPIC APPENDECTOMY N/A  7/28/2015    Procedure: LAPAROSCOPIC APPENDECTOMY;  Surgeon: Lucila Tian MD;  Location: [facility] OR     REMOVE PORT VASCULAR ACCESS Left 7/28/2015    Procedure: REMOVE PORT VASCULAR ACCESS;  Surgeon: Lucila Tian MD;  Location: [facility] OR     retina surgery Left 01/19/2018     THYROIDECTOMY  11/18/2013    Procedure: THYROIDECTOMY;;  Surgeon: Lucila Tian MD;  Location: [facility] OR     TONSILLECTOMY & ADENOIDECTOMY  age 12        Social History   Substance Use Topics     Smoking status: Never Smoker     Smokeless tobacco: Never Used     Alcohol use No     Family History   Problem Relation Age of Onset     CEREBROVASCULAR DISEASE Father      Hypertension Father      GASTROINTESTINAL DISEASE Mother      liver issues     Gallbladder Disease Mother      Eye Disorder Sister      Gallbladder Disease Maternal Grandmother      Gallbladder Disease Sister          Current Outpatient Prescriptions   Medication Sig Dispense Refill     Levothyroxine Sodium 137 MCG CAPS Take 1 tablet by mouth daily 90 capsule 3     NIFEdipine ER osmotic (PROCARDIA XL) 60 MG TB24 Take 1 tablet (60 mg) by mouth daily 90 tablet 3     FISH OIL        Allergies   Allergen Reactions     Chlorhexidine Rash     Chloraprep - red itchy rash.  Patient report.       Reviewed and updated as needed this visit by clinical staff       Reviewed and updated as needed this visit by Provider         ROS:  CONSTITUTIONAL: NEGATIVE for fever, chills, change in weight  RESP: NEGATIVE for significant cough or SOB  CV: NEGATIVE for chest pain, palpitations or peripheral edema  Skin : rash on the back and front of the chest, recurrent, comes and goes.    OBJECTIVE:     There were no vitals taken for this visit.  There is no height or weight on file to calculate BMI.  GENERAL: healthy, alert and no distress  NECK: no adenopathy, no asymmetry, masses, or scars and thyroid normal to palpation  RESP: lungs clear to auscultation - no rales, rhonchi or wheezes  CV: regular  rate and rhythm, normal S1 S2, no S3 or S4, no murmur, click or rub, no peripheral edema and peripheral pulses strong  MS: no gross musculoskeletal defects noted, no edema  SKIN: small multiple red macules with flakes spread on the back.        ASSESSMENT/PLAN:             1. Hypothyroidism due to Hashimoto's thyroiditis  Check TSH today, and renew medications accordingly.  - Levothyroxine Sodium 137 MCG CAPS; Take 1 tablet by mouth daily  Dispense: 90 capsule; Refill: 3  - TSH    2. Benign essential hypertension  Controlled, continue on   - NIFEdipine ER osmotic (PROCARDIA XL) 60 MG TB24; Take 1 tablet (60 mg) by mouth daily  Dispense: 90 tablet; Refill: 3  - Lipid panel reflex to direct LDL Non-fasting    3. Special screening for malignant neoplasms, colon    - Fecal colorectal cancer screen (FIT); Future    4. Tinea versicolor  Will refill prescription for   - ketoconazole (NIZORAL) 2 % cream; Apply topically 2 times daily  Dispense: 60 g; Refill: 3  Use for 3 weeks.    Follow up in 1 year.    Kvng Malhotra MD  [facility]

## 2018-04-04 NOTE — LETTER
April 5, 2018      Simba Fuentes  [address] [place] [id number]        Dear ,    We are writing to inform you of your test results.    Your cholesterol is slightly elevated, please make sure you start weight loss plan, exercise 3 hours/week, and start eating more vegetables, fruits, nuts, and fish products (twice weekly), and eat less of processed food, fast food, white bread/pasta, baked sweets, bagels,and fried food.  The thyroid test is normal.  I recommend doing the above and follow up with me in 6 months to repeat your cholesterol again, as your risk for heart disease is elevated due to your age, blood pressure and cholesterol.    Resulted Orders   TSH   Result Value Ref Range    TSH 0.49 0.40 - 4.00 mU/L   Lipid panel reflex to direct LDL Non-fasting   Result Value Ref Range    Cholesterol 194 <200 mg/dL    Triglycerides 99 <150 mg/dL      Comment:      Non Fasting    HDL Cholesterol 42 >39 mg/dL    LDL Cholesterol Calculated 132 (H) <100 mg/dL      Comment:      Above desirable:  100-129 mg/dl  Borderline High:  130-159 mg/dL  High:             160-189 mg/dL  Very high:       >189 mg/dl      Non HDL Cholesterol 152 (H) <130 mg/dL      Comment:      Above Desirable:  130-159 mg/dl  Borderline high:  160-189 mg/dl  High:             190-219 mg/dl  Very high:       >219 mg/dl             If you have any questions or concerns, please call the clinic at the number listed above.       Sincerely,        Kvng Malhotra MD

## 2018-05-04 ENCOUNTER — TRANSFERRED RECORDS (OUTPATIENT)
Dept: HEALTH INFORMATION MANAGEMENT | Facility: CLINIC | Age: 63
End: 2018-05-04

## 2018-06-02 DIAGNOSIS — I10 BENIGN ESSENTIAL HYPERTENSION: ICD-10-CM

## 2018-06-06 RX ORDER — NIFEDIPINE 60 MG/1
TABLET, EXTENDED RELEASE ORAL
Start: 2018-06-06

## 2018-06-06 NOTE — TELEPHONE ENCOUNTER
Refill denied with note to pharmacist: Please check file. One year refills sent E-Prescribing Status: Receipt confirmed by pharmacy (4/4/2018 10:37 AM CDT)  Jose A Yu RN

## 2018-07-25 ENCOUNTER — TRANSFERRED RECORDS (OUTPATIENT)
Dept: SURGERY | Facility: CLINIC | Age: 63
End: 2018-07-25

## 2018-07-25 ENCOUNTER — TRANSFERRED RECORDS (OUTPATIENT)
Dept: HEALTH INFORMATION MANAGEMENT | Facility: CLINIC | Age: 63
End: 2018-07-25

## 2018-08-15 ENCOUNTER — TELEPHONE (OUTPATIENT)
Dept: FAMILY MEDICINE | Facility: CLINIC | Age: 63
End: 2018-08-15

## 2018-08-15 DIAGNOSIS — Z12.11 COLON CANCER SCREENING: Primary | ICD-10-CM

## 2018-08-15 NOTE — TELEPHONE ENCOUNTER
Panel Management Review      Patient has the following on his problem list:     Hypertension   Last three blood pressure readings:  BP Readings from Last 3 Encounters:   06/20/17 112/77   02/22/17 111/79   05/16/16 107/77     Blood pressure: Passed    HTN Guidelines:  Age 18-59 BP range:  Less than 140/90  Age 60-85 with Diabetes:  Less than 140/90  Age 60-85 without Diabetes:  less than 150/90      Composite cancer screening  Chart review shows that this patient is due/due soon for the following Fecal Colorectal (FIT)  Summary:    Patient is due/failing the following:   FIT    Action needed:   Patient needs referral/order: FIT test    Type of outreach:    routed to panel pool for outreach    Questions for provider review:    None                                                                                                                                    Itzel Sunshine CMA       Chart routed to Care Team .

## 2018-08-20 NOTE — TELEPHONE ENCOUNTER
Type of outreach:  Phone, spoke to patient.  Informed pt he is due for FIT test.  He states he will stop at clinic later this week to pick it up  Kelsi Villanueva, CMA

## 2019-01-04 ENCOUNTER — TELEPHONE (OUTPATIENT)
Dept: FAMILY MEDICINE | Facility: CLINIC | Age: 64
End: 2019-01-04

## 2019-01-04 DIAGNOSIS — Z12.11 SPECIAL SCREENING FOR MALIGNANT NEOPLASMS, COLON: Primary | ICD-10-CM

## 2019-01-04 NOTE — TELEPHONE ENCOUNTER
Panel Management Review      Patient has the following on his problem list:     Hypertension   Last three blood pressure readings:  BP Readings from Last 3 Encounters:   06/20/17 112/77   02/22/17 111/79   05/16/16 107/77     Blood pressure: Passed    HTN Guidelines:  Age 18-59 BP range:  Less than 140/90  Age 60-85 with Diabetes:  Less than 140/90  Age 60-85 without Diabetes:  less than 150/90      Composite cancer screening  Chart review shows that this patient is due/due soon for the following Fecal Colorectal (FIT)  Summary:    Patient is due/failing the following:   FIT    Action needed:   Patient needs referral/order: FIT    Type of outreach:    routed to panel pool for outreach    Questions for provider review:    None                                                                                                                                    Itzel Sunshine       Chart routed to Care Team .

## 2019-02-22 PROCEDURE — 82274 ASSAY TEST FOR BLOOD FECAL: CPT | Performed by: FAMILY MEDICINE

## 2019-02-27 DIAGNOSIS — Z12.11 SPECIAL SCREENING FOR MALIGNANT NEOPLASMS, COLON: ICD-10-CM

## 2019-02-27 LAB — HEMOCCULT STL QL IA: NEGATIVE

## 2019-04-02 ENCOUNTER — TELEPHONE (OUTPATIENT)
Dept: FAMILY MEDICINE | Facility: CLINIC | Age: 64
End: 2019-04-02

## 2019-04-02 ENCOUNTER — OFFICE VISIT (OUTPATIENT)
Dept: FAMILY MEDICINE | Facility: CLINIC | Age: 64
End: 2019-04-02
Payer: OTHER GOVERNMENT

## 2019-04-02 VITALS
RESPIRATION RATE: 20 BRPM | OXYGEN SATURATION: 95 % | TEMPERATURE: 97.5 F | HEART RATE: 82 BPM | HEIGHT: 72 IN | SYSTOLIC BLOOD PRESSURE: 132 MMHG | BODY MASS INDEX: 37.65 KG/M2 | WEIGHT: 278 LBS | DIASTOLIC BLOOD PRESSURE: 79 MMHG

## 2019-04-02 DIAGNOSIS — E66.01 MORBID OBESITY (H): ICD-10-CM

## 2019-04-02 DIAGNOSIS — I10 BENIGN ESSENTIAL HYPERTENSION: ICD-10-CM

## 2019-04-02 DIAGNOSIS — E06.3 HYPOTHYROIDISM DUE TO HASHIMOTO'S THYROIDITIS: ICD-10-CM

## 2019-04-02 LAB
CHOLEST SERPL-MCNC: 191 MG/DL
GLUCOSE SERPL-MCNC: 105 MG/DL (ref 70–99)
HDLC SERPL-MCNC: 45 MG/DL
LDLC SERPL CALC-MCNC: 130 MG/DL
NONHDLC SERPL-MCNC: 146 MG/DL
TRIGL SERPL-MCNC: 81 MG/DL
TSH SERPL DL<=0.005 MIU/L-ACNC: 0.9 MU/L (ref 0.4–4)

## 2019-04-02 PROCEDURE — 82947 ASSAY GLUCOSE BLOOD QUANT: CPT | Performed by: FAMILY MEDICINE

## 2019-04-02 PROCEDURE — 36415 COLL VENOUS BLD VENIPUNCTURE: CPT | Performed by: FAMILY MEDICINE

## 2019-04-02 PROCEDURE — 80061 LIPID PANEL: CPT | Performed by: FAMILY MEDICINE

## 2019-04-02 PROCEDURE — 84443 ASSAY THYROID STIM HORMONE: CPT | Performed by: FAMILY MEDICINE

## 2019-04-02 PROCEDURE — 99214 OFFICE O/P EST MOD 30 MIN: CPT | Performed by: FAMILY MEDICINE

## 2019-04-02 RX ORDER — NIFEDIPINE 60 MG/1
60 TABLET, EXTENDED RELEASE ORAL DAILY
Qty: 90 TABLET | Refills: 3 | Status: SHIPPED | OUTPATIENT
Start: 2019-04-02 | End: 2020-03-17

## 2019-04-02 RX ORDER — LEVOTHYROXINE SODIUM 137 UG/1
CAPSULE ORAL DAILY
Status: CANCELLED | OUTPATIENT
Start: 2019-04-02

## 2019-04-02 ASSESSMENT — MIFFLIN-ST. JEOR: SCORE: 2099

## 2019-04-02 NOTE — TELEPHONE ENCOUNTER
LMOM for pt to return call to yunior Villanueva CMA    ------    Notes recorded by Kvng Malhotra MD on 4/2/2019 at 2:13 PM CDT  Please call patient :  His risk for heart disease is high, will he be consider starting on statin drugs : like zocor or lipitor?  The 10-year ASCVD risk score (Lisseth WELLER Jr., et al., 2013) is: 12.8%    Values used to calculate the score:      Age: 63 years      Sex: Male      Is Non- : No      Diabetic: No      Tobacco smoker: No      Systolic Blood Pressure: 132 mmHg      Is BP treated: Yes      HDL Cholesterol: 45 mg/dL      Total Cholesterol: 191 mg/dL

## 2019-04-02 NOTE — PROGRESS NOTES
SUBJECTIVE:   Simba Fuentes is a 63 year old male who presents to clinic today for the following health issues:      Hypertension Follow-up      Outpatient blood pressures are being checked at home.  Results are wnl.    Low Salt Diet: no added salt    Hypothyroidism Follow-up      Since last visit, patient describes the following symptoms: Weight stable, no hair loss, no skin changes, no constipation, no loose stools      Amount of exercise or physical activity: None    Problems taking medications regularly: No    Medication side effects: none    Diet: low salt            Problem list and histories reviewed & adjusted, as indicated.  Additional history: pt has gained weight since last visit, he is not exercising as much as before.    Patient Active Problem List   Diagnosis     CARDIOVASCULAR SCREENING; LDL GOAL LESS THAN 160     Advanced directives, counseling/discussion     Seborrheic keratosis     Obesity     Nephrolithiasis     Ascending aorta enlargement (H)     Thyroid nodule     Seasonal allergic rhinitis     High grade B-cell lymphoma (H)     Renal cyst     Appendicitis     Hypothyroidism due to Hashimoto's thyroiditis     Benign essential hypertension     Tinea versicolor     Obesity (BMI 35.0-39.9) with comorbidity (H)     Past Surgical History:   Procedure Laterality Date     ABDOMEN SURGERY       BIOPSY       BONE MARROW BIOPSY, BONE SPECIMEN, NEEDLE/TROCAR  11/18/2013    Procedure: BIOPSY BONE MARROW;;  Surgeon: Arthur Henry MD;  Location:  OR     hemrrhoid  1994     INSERT PORT VASCULAR ACCESS  11/18/2013    Procedure: INSERT PORT VASCULAR ACCESS;  Bilateral Hip bone marrow biopsies, Left thyroid biopsy, pretracheal lymph node biopsy,  Insertion port-a-cath left chest;  Surgeon: Lucila Tian MD;  Location:  OR     LAPAROSCOPIC APPENDECTOMY N/A 7/28/2015    Procedure: LAPAROSCOPIC APPENDECTOMY;  Surgeon: Lucila Tian MD;  Location:  OR     REMOVE PORT VASCULAR ACCESS Left  7/28/2015    Procedure: REMOVE PORT VASCULAR ACCESS;  Surgeon: Lucila Tian MD;  Location: [facility] OR     retina surgery Left 01/19/2018     THYROIDECTOMY  11/18/2013    Procedure: THYROIDECTOMY;;  Surgeon: Lucila Tian MD;  Location: [facility] OR     TONSILLECTOMY & ADENOIDECTOMY  age 12        Social History     Tobacco Use     Smoking status: Never Smoker     Smokeless tobacco: Never Used   Substance Use Topics     Alcohol use: No     Alcohol/week: 0.0 oz     Family History   Problem Relation Age of Onset     Cerebrovascular Disease Father      Hypertension Father      Gastrointestinal Disease Mother         liver issues     Gallbladder Disease Mother      Eye Disorder Sister      Gallbladder Disease Maternal Grandmother      Gallbladder Disease Sister          Current Outpatient Medications   Medication Sig Dispense Refill     FISH OIL        ketoconazole (NIZORAL) 2 % cream Apply topically 2 times daily 60 g 3     Levothyroxine Sodium 137 MCG CAPS Take 1 tablet by mouth daily 90 capsule 3     NIFEdipine ER osmotic (PROCARDIA XL) 60 MG TB24 Take 1 tablet (60 mg) by mouth daily 90 tablet 3     Allergies   Allergen Reactions     Chlorhexidine Rash     Chloraprep - red itchy rash.  Patient report.     Recent Labs   Lab Test 04/04/18  1040 06/12/17 02/22/17  0858 05/02/16 09/05/14  0940 08/04/14  1120   *  --  121*  --   --  118  --    HDL 42  --  40  --   --  51  --    TRIG 99  --  78  --   --  93  --    ALT  --  20  --  25  --   --  36   CR  --  1.10 1.03 1.10   < >  --  0.97   GFRESTIMATED  --  >60 74 >60   < >  --  79   GFRESTBLACK  --  >60 89 >60   < >  --  >90   GFR Calc     POTASSIUM  --  4.0 4.3 4.0   < >  --  3.9   TSH 0.49  --  0.70  --    < >  --   --     < > = values in this interval not displayed.      BP Readings from Last 3 Encounters:   04/02/19 132/79   06/20/17 112/77   02/22/17 111/79    Wt Readings from Last 3 Encounters:   04/02/19 126.1 kg (278 lb)   06/20/17 121.1 kg  (267 lb)   02/22/17 125.2 kg (276 lb)                    Reviewed and updated as needed this visit by clinical staff  Tobacco  Allergies  Meds  Med Hx  Surg Hx  Fam Hx  Soc Hx      Reviewed and updated as needed this visit by Provider         ROS:  RESP: NEGATIVE for significant cough or SOB  CV: NEGATIVE for chest pain, palpitations or peripheral edema  ENDOCRINE: NEGATIVE for temperature intolerance, skin/hair changes    OBJECTIVE:     /79 (BP Location: Right arm, Patient Position: Chair, Cuff Size: Adult Large)   Pulse 82   Temp 97.5  F (36.4  C) (Oral)   Resp 20   Ht 1.829 m (6')   Wt 126.1 kg (278 lb)   SpO2 95%   BMI 37.70 kg/m    Body mass index is 37.7 kg/m .  GENERAL: healthy, alert and no distress  NECK: no adenopathy, no asymmetry, masses, or scars and thyroid normal to palpation  RESP: lungs clear to auscultation - no rales, rhonchi or wheezes  CV: regular rate and rhythm, normal S1 S2, no S3 or S4, no murmur, click or rub, no peripheral edema and peripheral pulses strong  MS: no gross musculoskeletal defects noted, no edema        ASSESSMENT/PLAN:             1. Hypothyroidism due to Hashimoto's thyroiditis  Controlled, check TSH and fill medicine accordingly   - TSH    2. Benign essential hypertension  Controlled, continue on   - NIFEdipine ER OSMOTIC (PROCARDIA XL) 60 MG 24 hr tablet; Take 1 tablet (60 mg) by mouth daily  Dispense: 90 tablet; Refill: 3    3. Morbid obesity (H)  Today I counseled the patient about diet, regular exercise and weight loss planning.    - Glucose  - Lipid panel reflex to direct LDL Fasting    Follow up in 1 year.    Kvng Malhotra MD  [facility]

## 2019-04-03 NOTE — TELEPHONE ENCOUNTER
Informed pt of AA's message.  He states he will think about it and get back to the clinic regarding taking medication.  I offered an appt with AA to go over results - he will get back to us.  I informed him that this is very important.   Kelsi Villanueva, CMA

## 2019-04-28 DIAGNOSIS — E06.3 HYPOTHYROIDISM DUE TO HASHIMOTO'S THYROIDITIS: ICD-10-CM

## 2019-04-28 NOTE — TELEPHONE ENCOUNTER
"Requested Prescriptions   Pending Prescriptions Disp Refills     SYNTHROID 137 MCG tablet [Pharmacy Med Name: SYNTHROID TAB 0.137MG]  This medication has been discontinued in patient chart  Last Written Prescription Date:  3/28/2017  Last Fill Quantity: 90 capsule,  # refills: 3   Last office visit: 4/2/2019 with prescribing provider:  Roscoe   Future Office Visit:     90 tablet 3     Sig: TAKE 1 TABLET DAILY       Thyroid Protocol Passed - 4/28/2019 10:34 AM        Passed - Patient is 12 years or older        Passed - Recent (12 mo) or future (30 days) visit within the authorizing provider's specialty     Patient had office visit in the last 12 months or has a visit in the next 30 days with authorizing provider or within the authorizing provider's specialty.  See \"Patient Info\" tab in inbasket, or \"Choose Columns\" in Meds & Orders section of the refill encounter.              Passed - Medication is active on med list        Passed - Normal TSH on file in past 12 months     Recent Labs   Lab Test 04/02/19  0924   TSH 0.90                "

## 2019-04-30 RX ORDER — LEVOTHYROXINE SODIUM 137 UG/1
137 TABLET ORAL DAILY
Qty: 90 TABLET | Refills: 3 | Status: SHIPPED | OUTPATIENT
Start: 2019-04-30 | End: 2020-03-17

## 2019-04-30 NOTE — TELEPHONE ENCOUNTER
Prescription approved per [facility] [facility]  Kalyani Moreno RN BS

## 2019-06-07 ENCOUNTER — OFFICE VISIT (OUTPATIENT)
Dept: URGENT CARE | Facility: URGENT CARE | Age: 64
End: 2019-06-07
Payer: OTHER GOVERNMENT

## 2019-06-07 VITALS
SYSTOLIC BLOOD PRESSURE: 126 MMHG | RESPIRATION RATE: 16 BRPM | BODY MASS INDEX: 37.95 KG/M2 | OXYGEN SATURATION: 97 % | DIASTOLIC BLOOD PRESSURE: 64 MMHG | TEMPERATURE: 97.4 F | HEIGHT: 72 IN | WEIGHT: 280.2 LBS | HEART RATE: 79 BPM

## 2019-06-07 DIAGNOSIS — M71.122 SEPTIC OLECRANON BURSITIS OF LEFT ELBOW: Primary | ICD-10-CM

## 2019-06-07 PROCEDURE — 96372 THER/PROPH/DIAG INJ SC/IM: CPT | Performed by: FAMILY MEDICINE

## 2019-06-07 PROCEDURE — 99214 OFFICE O/P EST MOD 30 MIN: CPT | Mod: 25 | Performed by: FAMILY MEDICINE

## 2019-06-07 RX ORDER — CEFTRIAXONE SODIUM 1 G
1 VIAL (EA) INJECTION ONCE
Status: COMPLETED | OUTPATIENT
Start: 2019-06-07 | End: 2019-06-07

## 2019-06-07 RX ORDER — CEPHALEXIN 500 MG/1
500 CAPSULE ORAL 4 TIMES DAILY
Qty: 40 CAPSULE | Refills: 0 | Status: SHIPPED | OUTPATIENT
Start: 2019-06-07 | End: 2019-06-17

## 2019-06-07 RX ADMIN — Medication 1 G: at 15:31

## 2019-06-07 ASSESSMENT — PAIN SCALES - GENERAL: PAINLEVEL: MILD PAIN (3)

## 2019-06-07 ASSESSMENT — MIFFLIN-ST. JEOR: SCORE: 2108.98

## 2019-06-07 NOTE — PATIENT INSTRUCTIONS
Patient Education     Bursitis of the Elbow (Olecranon)  Your elbow joint contains a small fluid-filled sac called a bursa. The bursa helps the muscles and tendons move smoothly over the bone. It also cushions and protects your elbow. Bursitis is when the bursa is inflamed or swollen. This is most often due to overuse of or injury to the elbow. Symptoms include swelling and pain. If the elbow is red and feels warm to the touch, the bursa itself may be infected.  In most cases, elbow bursitis resolves with medicine and self-care at home. It may take several weeks for the bursa to heal and the swelling to go away. In some cases, your healthcare provider may drain excess fluid from the bursa. Or, he or she may inject medicine directly into the bursa to help relieve symptoms. In severe cases, you may need surgery to remove the bursa may. If there is concern that the bursa is infected, your healthcare provider may prescribe antibiotics to treat the infection.    Home care  Your healthcare provider may prescribe medicine to help relieve pain and swelling. This may be an over-the-counter pain reliever or prescription pain medicine. Take all medicines as directed. To help treat or prevent infection, your provider may prescribe antibiotics. If these are prescribed, take them as directed until they are gone.  The following are general care guidelines:    Apply an ice pack or bag of frozen peas wrapped in a thin towel to your elbow for 15 to 20 minutes at a time. Do this 3 to 4 times a day until pain and swelling improve.    Keep your elbow raised above the level of your heart whenever possible. This helps reduce swelling. When sitting or lying down, place your arm on a pillow that rests on your chest or on a pillow at your side.    Use an elastic wrap around the elbow joint to compress the area while it is healing. Make the wrap snug but not tight to the point of causing pain.    Rest your elbow to give it time to heal. You  may need to wear an elbow pad to help protect and limit the movement of your elbow. During and after healing, avoid leaning on your elbows.  Follow-up care  Follow up with your healthcare provider, or as advised. If you have been referred to a specialist, make that appointment promptly.  When to seek medical advice  Call your healthcare provider right away if any of these occur:    Fever of 100.4 F (38 C) or higher, or as advised    Chills    Increased pain, swelling, warmth, redness, or drainage from the joint    Trouble moving the elbow joint    Numbness or tingling in the hand    Severe pain or swelling in forearm or hand    Loss of pink color and slow return of color after squeezing fingertip or hand  Date Last Reviewed: 6/1/2016 2000-2018 [employer]. [address] [place] [address], [place] [address]. All rights reserved. This information is not intended as a substitute for professional medical care. Always follow your healthcare professional's instructions.

## 2019-06-08 NOTE — PROGRESS NOTES
SUBJECTIVE:  Chief Complaint   Patient presents with     Urgent Care     Elbow left     Left elbow looks red, swollen, and has a sore on it, seems to be infected x 1d  Hurts to bend arm, wife mentioned he has had the scab on elbow for more than 1 wk     Simba Fuentes is a 63 year old male who presents with a chief complaint of left  elbow pain, swelling, tenderness and redness.     No history of injury-  But he has a scab of the elbow- he can not remember how he injured himself  Patient has not had a history of gout   The injury/ scab on the elbow occurred 2 + week(s) ago. According to his wife-  But unknown cause      The patient complained of moderate pain  and has had  Mild decreased ROM.  Pain exacerbated by pressure on olecranon.  Relieved by rest and ice.  He treated it initially with ice. This is the first time this type joint swelling  has occurred to this patient.      Past Medical History:   Diagnosis Date     Acquired hypothyroidism 4/29/2016     Ascending aorta enlargement (H) 8/22/2012     Benign essential hypertension 2/22/2017     CARDIOVASCULAR SCREENING; LDL GOAL LESS THAN 160 10/31/2010     Chest pain 3/19/2012     High grade B-cell lymphoma (H) 11/11/2013     HTN, goal below 140/90 6/8/2013     Hypothyroidism due to Hashimoto's thyroiditis 2/22/2017     Medical non-compliance 6/8/2013     Nephrolithiasis 8/22/2012     Obesity 3/10/2012     Renal cyst 11/11/2013     Seasonal allergic rhinitis 6/8/2013     Seborrheic keratosis 3/10/2012     Small cell B-cell lymphoma (H) 11/11/2013     Thyroid nodule 6/8/2013     Tinea versicolor 4/4/2018     Patient Active Problem List   Diagnosis     CARDIOVASCULAR SCREENING; LDL GOAL LESS THAN 160     Advanced directives, counseling/discussion     Seborrheic keratosis     Obesity     Nephrolithiasis     Ascending aorta enlargement (H)     Thyroid nodule     Seasonal allergic rhinitis     High grade B-cell lymphoma (H)     Renal cyst     Appendicitis      Hypothyroidism due to Hashimoto's thyroiditis     Benign essential hypertension     Tinea versicolor     Obesity (BMI 35.0-39.9) with comorbidity (H)       ALLERGIES:  Chlorhexidine      Current Outpatient Medications on File Prior to Visit:  FISH OIL    levothyroxine (SYNTHROID) 137 MCG tablet Take 1 tablet (137 mcg) by mouth daily   NIFEdipine ER OSMOTIC (PROCARDIA XL) 60 MG 24 hr tablet Take 1 tablet (60 mg) by mouth daily   ketoconazole (NIZORAL) 2 % cream Apply topically 2 times daily (Patient not taking: Reported on 6/7/2019)     No current facility-administered medications on file prior to visit.     Social History     Tobacco Use     Smoking status: Never Smoker     Smokeless tobacco: Never Used   Substance Use Topics     Alcohol use: No     Alcohol/week: 0.0 oz       Family History   Problem Relation Age of Onset     Cerebrovascular Disease Father      Hypertension Father      Gastrointestinal Disease Mother         liver issues     Gallbladder Disease Mother      Eye Disorder Sister      Gallbladder Disease Maternal Grandmother      Gallbladder Disease Sister          ROS:  CONSTITUTIONAL:NEGATIVE for fever, chills,    EYES: NEGATIVE for vision changes or irritation  ENT/MOUTH: NEGATIVE for ear, mouth and throat problems  RESP:NEGATIVE for significant cough or SOB  GI: NEGATIVE for nausea, abdominal pain,      EXAM:   /64 (BP Location: Right arm, Patient Position: Sitting, Cuff Size: Adult Large)   Pulse 79   Temp 97.4  F (36.3  C) (Tympanic)   Resp 16   Ht 1.829 m (6')   Wt 127.1 kg (280 lb 3.2 oz)   SpO2 97%   BMI 38.00 kg/m    Gen: alert, moderate distress and cooperative  left elbow has erythema, swelling and point tenderness over the olecranon.   Wound to the skin of the elbow noted- - only scab 5 x 5 mm  Redness/ inflammation of the skin noted - with tenderness- small amount of fluctuance  The elbow is tender to palpation   posterior over the olecranon- no pain medial/lateral  PROM of  the elbow elicits pain- -little  AROM of the elbow elicits pain - little  Pronation/ supination of the forearm elicits pain -  - none  There is not compromise to the distal circulation.  Pulses are +2 and CRT is brisk          EYES:   EOMI,   conjunctiva clear  HENT:   External ears with no swelling or lesions   Nose and lips without  Swelling, ulcers, erythema or lesions  NECK:   normal pain free ROM  RESP:   no labored respirations, no tachypnea  EXTREMITIES:   Full ROM without expression of pain or limitation x 3 remaining  extremities  NEURO:   Normal strength and tone, ambulation without difficulty,   normal speech and mentation  PSYCH:   mentation and affect appears normal and patient appearance--appropriately groomed         ASSESSMENT:   Septic olecranon bursitis of left elbow     - cephALEXin (KEFLEX) 500 MG capsule; Take 1 capsule (500 mg) by mouth 4 times daily for 10 days     Due to concern of severe infection in the olecranon bursa-  He was given injection of Ceftriaxone 1 gm in clinic     Discussed that the volume of fluid in his bursa is too small for aspiration for culture/ crystal analysis     Antibiotic for suspected infected bursa/ joint fluid:     Ibuprofen for inflammation     Acetaminophen   as alternative for pain    Follow-up with primary care in 1 week if symptoms not   Resolved, or greatly improved

## 2019-07-23 ENCOUNTER — TRANSFERRED RECORDS (OUTPATIENT)
Dept: HEALTH INFORMATION MANAGEMENT | Facility: CLINIC | Age: 64
End: 2019-07-23

## 2019-07-23 ENCOUNTER — TRANSFERRED RECORDS (OUTPATIENT)
Dept: SURGERY | Facility: CLINIC | Age: 64
End: 2019-07-23

## 2019-11-19 DIAGNOSIS — I71.21 THORACIC ASCENDING AORTIC ANEURYSM (H): Primary | ICD-10-CM

## 2019-11-19 NOTE — PROGRESS NOTES
Patient called to schedule echocardiogram, but order is . Patient is overdue to be seen in clinic as well. New echo order placed and VM transferred to scheduling to set up echo and next available OV with Dr. Gayle.

## 2020-03-13 ENCOUNTER — TELEPHONE (OUTPATIENT)
Dept: CARDIOLOGY | Facility: CLINIC | Age: 65
End: 2020-03-13

## 2020-03-13 NOTE — TELEPHONE ENCOUNTER
Patient is scheduled for an OV on Monday 03- w/Dr. Gayle.     LOV (2017)  Patient overdue for f/u.   Echo is scheduled after the visit on 03-    Appt needs to be rescheduled.     Patient needs to be screened.     Called patient. Left patient a message. Awaiting call back.     AIXA Gordon RN  [facility]     ------------------------------------------------------------------------------------------------------------------------------      Symptom Screening:    Do you have a:    Fever?  no    Cough?  no    Shortness of breath?  no (if yes, is this a change?)    Skin rash?  no      Within the past 14 days, have you been in contact with someone who:    Is currently being ruled out for COVID-19 (novel coronavirus)?  no    Has tested positive for COVID-10?  no    Has symptoms of a respiratory illness (fever, cough, shortness of breath)?  no    Have you or someone you have been in contact with traveled to an area with COVID-19: no    Refer to the CDC Coronavirus webpage for COVID-19 areas: (if yes, what country?)    Within the past 3 weeks, have you been exposed to the following:      Pertussis?  no    Chicken pox?  no    Measles? no     Patient's appointment status:  Patient will be seen in clinic as scheduled.    Patient flew in from another state to be seen.     AIXA Gordon RN  [facility]     .

## 2020-03-16 ENCOUNTER — OFFICE VISIT (OUTPATIENT)
Dept: CARDIOLOGY | Facility: CLINIC | Age: 65
End: 2020-03-16
Attending: INTERNAL MEDICINE
Payer: OTHER GOVERNMENT

## 2020-03-16 VITALS
HEIGHT: 72 IN | SYSTOLIC BLOOD PRESSURE: 114 MMHG | WEIGHT: 292 LBS | HEART RATE: 72 BPM | DIASTOLIC BLOOD PRESSURE: 80 MMHG | BODY MASS INDEX: 39.55 KG/M2

## 2020-03-16 DIAGNOSIS — I71.21 THORACIC ASCENDING AORTIC ANEURYSM (H): ICD-10-CM

## 2020-03-16 PROCEDURE — 99213 OFFICE O/P EST LOW 20 MIN: CPT | Performed by: INTERNAL MEDICINE

## 2020-03-16 ASSESSMENT — MIFFLIN-ST. JEOR: SCORE: 2157.5

## 2020-03-16 NOTE — PROGRESS NOTES
Cardiology Consultation      Simba Fuentes MRN# [id number]   YOB: 1955 Age: 64 year old   Date of Visit 03/16/2020     Reason for consult:  Aortic aneurysm           Assessment and Plan:     1. Ascending aortic aneurysm, previously 4.6-4.8 cm last checked 2017    Echocardiogram pending for tomorrow.  Will call patient with results.    Continue good blood pressure control.      This note was transcribed using electronic voice recognition software, typographical errors may be present.                Chief Complaint:   Heart Problem (Thoracic ascending aortic aneurysm)           History of Present Illness:   This patient is a very pleasant 64 year old male that has moved to [place].  He still comes back for his medical care.  He has a history of non-Hodgkin's lymphoma and ascending aortic aneurysm which has been fairly stable measuring between 4.6 and 4.8 cm.  He was last checked in 2017.  He has upcoming echocardiogram tomorrow to check the thoracic aneurysm.  He has gained some weight from stress eating.  He denies any dyspnea on exertion or exertional chest discomfort.               Physical Exam:     Vitals: /80 (BP Location: Right arm, Patient Position: Sitting, Cuff Size: Adult Large)   Pulse 72   Ht 1.829 m (6')   Wt 132.5 kg (292 lb)   BMI 39.60 kg/m    Constitutional:  cooperative, alert and oriented, well developed, well nourished, in no acute distress obese      Skin:  warm and dry to the touch, no apparent skin lesions or masses noted        Head:  normocephalic, no masses or lesions        Eyes:       Left postoperative    ENT:  no pallor or cyanosis, dentition good        Neck:  JVP normal        Chest:  normal breath sounds, clear to auscultation, normal A-P diameter, normal symmetry, normal respiratory excursion, no use of accessory muscles        Cardiac: regular rhythm                  Abdomen:  BS normoactive   benign    Extremities and Back:  no deformities, clubbing,  cyanosis, erythema observed        Neurological:  no gross motor deficits;affect appropriate                    Past Medical History:   I have reviewed this patient's past medical history  Past Medical History:   Diagnosis Date     Acquired hypothyroidism 4/29/2016     Ascending aorta enlargement (H) 8/22/2012     Benign essential hypertension 2/22/2017     CARDIOVASCULAR SCREENING; LDL GOAL LESS THAN 160 10/31/2010     Chest pain 3/19/2012     High grade B-cell lymphoma (H) 11/11/2013     HTN, goal below 140/90 6/8/2013     Hypothyroidism due to Hashimoto's thyroiditis 2/22/2017     Medical non-compliance 6/8/2013     Nephrolithiasis 8/22/2012     Obesity 3/10/2012     Renal cyst 11/11/2013     Seasonal allergic rhinitis 6/8/2013     Seborrheic keratosis 3/10/2012     Small cell B-cell lymphoma (H) 11/11/2013     Thyroid nodule 6/8/2013     Tinea versicolor 4/4/2018             Past Surgical History:   I have reviewed this patient's past surgical history  Past Surgical History:   Procedure Laterality Date     ABDOMEN SURGERY       BIOPSY       BONE MARROW BIOPSY, BONE SPECIMEN, NEEDLE/TROCAR  11/18/2013    Procedure: BIOPSY BONE MARROW;;  Surgeon: Arthur Henry MD;  Location:  OR     hemrrhoid  1994     INSERT PORT VASCULAR ACCESS  11/18/2013    Procedure: INSERT PORT VASCULAR ACCESS;  Bilateral Hip bone marrow biopsies, Left thyroid biopsy, pretracheal lymph node biopsy,  Insertion port-a-cath left chest;  Surgeon: Lucila Tian MD;  Location: [facility] OR     LAPAROSCOPIC APPENDECTOMY N/A 7/28/2015    Procedure: LAPAROSCOPIC APPENDECTOMY;  Surgeon: Lucila Tian MD;  Location:  OR     REMOVE PORT VASCULAR ACCESS Left 7/28/2015    Procedure: REMOVE PORT VASCULAR ACCESS;  Surgeon: Lucila Tian MD;  Location:  OR     retina surgery Left 01/19/2018     THYROIDECTOMY  11/18/2013    Procedure: THYROIDECTOMY;;  Surgeon: Lucila Tian MD;  Location:  OR     TONSILLECTOMY & ADENOIDECTOMY  age  12                Social History:   I have reviewed this patient's social history  Social History     Tobacco Use     Smoking status: Never Smoker     Smokeless tobacco: Never Used   Substance Use Topics     Alcohol use: No     Alcohol/week: 0.0 standard drinks             Family History:   I have reviewed this patient's family history  Family History   Problem Relation Age of Onset     Cerebrovascular Disease Father      Hypertension Father      Gastrointestinal Disease Mother         liver issues     Gallbladder Disease Mother      Eye Disorder Sister      Gallbladder Disease Maternal Grandmother      Gallbladder Disease Sister              Allergies:     Allergies   Allergen Reactions     Chlorhexidine Rash     Chloraprep - red itchy rash.  Patient report.             Medications:   I have reviewed this patient's current medications  Current Outpatient Medications   Medication Sig Dispense Refill     FISH OIL 1-2 capsules daily       levothyroxine (SYNTHROID) 137 MCG tablet Take 1 tablet (137 mcg) by mouth daily 90 tablet 3     NIFEdipine ER OSMOTIC (PROCARDIA XL) 60 MG 24 hr tablet Take 1 tablet (60 mg) by mouth daily 90 tablet 3     ketoconazole (NIZORAL) 2 % cream Apply topically 2 times daily (Patient not taking: Reported on 6/7/2019) 60 g 3               Review of Systems:     Review of Systems:  Skin:  Negative     Eyes:  Positive for glasses;cataracts  ENT:  Negative    Respiratory:  Negative    Cardiovascular:    Positive for  Gastroenterology: Negative    Genitourinary:  Negative    Musculoskeletal:  Negative    Neurologic:  Positive for numbness or tingling of feet  Psychiatric:  Negative    Heme/Lymph/Imm:  Negative    Endocrine:  Positive for thyroid disorder                     Data:   All laboratory data reviewed  Lab Results   Component Value Date    CHOL 191 04/02/2019     Lab Results   Component Value Date    HDL 45 04/02/2019     Lab Results   Component Value Date     04/02/2019     Lab  Results   Component Value Date    TRIG 81 04/02/2019     Lab Results   Component Value Date    CHOLHDLRATIO 3.7 09/05/2014     TSH   Date Value Ref Range Status   04/02/2019 0.90 0.40 - 4.00 mU/L Final     Last Basic Metabolic Panel:  Lab Results   Component Value Date     06/12/2017      Lab Results   Component Value Date    POTASSIUM 4.0 06/12/2017     Lab Results   Component Value Date    CHLORIDE 105 06/12/2017     Lab Results   Component Value Date    MAXINE 9.2 06/12/2017     Lab Results   Component Value Date    CO2 23 06/12/2017     Lab Results   Component Value Date    BUN 17 06/12/2017     Lab Results   Component Value Date    CR 1.10 06/12/2017     Lab Results   Component Value Date     04/02/2019     Lab Results   Component Value Date    WBC 5.1 06/12/2017     Lab Results   Component Value Date    RBC 4.25 06/12/2017     Lab Results   Component Value Date    HGB 13.3 06/12/2017     Lab Results   Component Value Date    HCT 37.9 06/12/2017     Lab Results   Component Value Date    MCV 89.2 06/12/2017     Lab Results   Component Value Date    MCH 31.3 06/12/2017     Lab Results   Component Value Date    MCHC 35.1 06/12/2017     Lab Results   Component Value Date    RDW 13.90 06/12/2017     Lab Results   Component Value Date     06/12/2017

## 2020-03-16 NOTE — LETTER
3/16/2020    Kvng Malhotra MD  [address] [facility] [address]    RE: Simba Fuentes       Dear Colleague,    I had the pleasure of seeing Simba Fuentes in the [facility].    Cardiology Consultation      Simba Fuentes MRN# [id number]   YOB: 1955 Age: 64 year old   Date of Visit 03/16/2020     Reason for consult:  Aortic aneurysm           Assessment and Plan:     1. Ascending aortic aneurysm, previously 4.6-4.8 cm last checked 2017    Echocardiogram pending for tomorrow.  Will call patient with results.    Continue good blood pressure control.      This note was transcribed using electronic voice recognition software, typographical errors may be present.                Chief Complaint:   Heart Problem (Thoracic ascending aortic aneurysm)           History of Present Illness:   This patient is a very pleasant 64 year old male that has moved to [place].  He still comes back for his medical care.  He has a history of non-Hodgkin's lymphoma and ascending aortic aneurysm which has been fairly stable measuring between 4.6 and 4.8 cm.  He was last checked in 2017.  He has upcoming echocardiogram tomorrow to check the thoracic aneurysm.  He has gained some weight from stress eating.  He denies any dyspnea on exertion or exertional chest discomfort.               Physical Exam:     Vitals: /80 (BP Location: Right arm, Patient Position: Sitting, Cuff Size: Adult Large)   Pulse 72   Ht 1.829 m (6')   Wt 132.5 kg (292 lb)   BMI 39.60 kg/m    Constitutional:  cooperative, alert and oriented, well developed, well nourished, in no acute distress obese      Skin:  warm and dry to the touch, no apparent skin lesions or masses noted        Head:  normocephalic, no masses or lesions        Eyes:       Left postoperative    ENT:  no pallor or cyanosis, dentition good        Neck:  JVP normal        Chest:  normal breath sounds, clear to auscultation, normal A-P diameter,  normal symmetry, normal respiratory excursion, no use of accessory muscles        Cardiac: regular rhythm                  Abdomen:  BS normoactive   benign    Extremities and Back:  no deformities, clubbing, cyanosis, erythema observed        Neurological:  no gross motor deficits;affect appropriate                    Past Medical History:   I have reviewed this patient's past medical history  Past Medical History:   Diagnosis Date     Acquired hypothyroidism 4/29/2016     Ascending aorta enlargement (H) 8/22/2012     Benign essential hypertension 2/22/2017     CARDIOVASCULAR SCREENING; LDL GOAL LESS THAN 160 10/31/2010     Chest pain 3/19/2012     High grade B-cell lymphoma (H) 11/11/2013     HTN, goal below 140/90 6/8/2013     Hypothyroidism due to Hashimoto's thyroiditis 2/22/2017     Medical non-compliance 6/8/2013     Nephrolithiasis 8/22/2012     Obesity 3/10/2012     Renal cyst 11/11/2013     Seasonal allergic rhinitis 6/8/2013     Seborrheic keratosis 3/10/2012     Small cell B-cell lymphoma (H) 11/11/2013     Thyroid nodule 6/8/2013     Tinea versicolor 4/4/2018             Past Surgical History:   I have reviewed this patient's past surgical history  Past Surgical History:   Procedure Laterality Date     ABDOMEN SURGERY       BIOPSY       BONE MARROW BIOPSY, BONE SPECIMEN, NEEDLE/TROCAR  11/18/2013    Procedure: BIOPSY BONE MARROW;;  Surgeon: Arthur Henry MD;  Location: [facility] OR     hemrrhoid  1994     INSERT PORT VASCULAR ACCESS  11/18/2013    Procedure: INSERT PORT VASCULAR ACCESS;  Bilateral Hip bone marrow biopsies, Left thyroid biopsy, pretracheal lymph node biopsy,  Insertion port-a-cath left chest;  Surgeon: Lucila Tian MD;  Location: [facility] OR     LAPAROSCOPIC APPENDECTOMY N/A 7/28/2015    Procedure: LAPAROSCOPIC APPENDECTOMY;  Surgeon: Lucila Tian MD;  Location: [facility] OR     REMOVE PORT VASCULAR ACCESS Left 7/28/2015    Procedure: REMOVE PORT VASCULAR ACCESS;  Surgeon: Asmita  MD Lucila;  Location: [facility] OR     retina surgery Left 01/19/2018     THYROIDECTOMY  11/18/2013    Procedure: THYROIDECTOMY;;  Surgeon: Lucila Tian MD;  Location: [facility] OR     TONSILLECTOMY & ADENOIDECTOMY  age 12                Social History:   I have reviewed this patient's social history  Social History     Tobacco Use     Smoking status: Never Smoker     Smokeless tobacco: Never Used   Substance Use Topics     Alcohol use: No     Alcohol/week: 0.0 standard drinks             Family History:   I have reviewed this patient's family history  Family History   Problem Relation Age of Onset     Cerebrovascular Disease Father      Hypertension Father      Gastrointestinal Disease Mother         liver issues     Gallbladder Disease Mother      Eye Disorder Sister      Gallbladder Disease Maternal Grandmother      Gallbladder Disease Sister              Allergies:     Allergies   Allergen Reactions     Chlorhexidine Rash     Chloraprep - red itchy rash.  Patient report.             Medications:   I have reviewed this patient's current medications  Current Outpatient Medications   Medication Sig Dispense Refill     FISH OIL 1-2 capsules daily       levothyroxine (SYNTHROID) 137 MCG tablet Take 1 tablet (137 mcg) by mouth daily 90 tablet 3     NIFEdipine ER OSMOTIC (PROCARDIA XL) 60 MG 24 hr tablet Take 1 tablet (60 mg) by mouth daily 90 tablet 3     ketoconazole (NIZORAL) 2 % cream Apply topically 2 times daily (Patient not taking: Reported on 6/7/2019) 60 g 3               Review of Systems:     Review of Systems:  Skin:  Negative     Eyes:  Positive for glasses;cataracts  ENT:  Negative    Respiratory:  Negative    Cardiovascular:    Positive for  Gastroenterology: Negative    Genitourinary:  Negative    Musculoskeletal:  Negative    Neurologic:  Positive for numbness or tingling of feet  Psychiatric:  Negative    Heme/Lymph/Imm:  Negative    Endocrine:  Positive for thyroid disorder                     Data:   All  laboratory data reviewed  Lab Results   Component Value Date    CHOL 191 04/02/2019     Lab Results   Component Value Date    HDL 45 04/02/2019     Lab Results   Component Value Date     04/02/2019     Lab Results   Component Value Date    TRIG 81 04/02/2019     Lab Results   Component Value Date    CHOLHDLRATIO 3.7 09/05/2014     TSH   Date Value Ref Range Status   04/02/2019 0.90 0.40 - 4.00 mU/L Final     Last Basic Metabolic Panel:  Lab Results   Component Value Date     06/12/2017      Lab Results   Component Value Date    POTASSIUM 4.0 06/12/2017     Lab Results   Component Value Date    CHLORIDE 105 06/12/2017     Lab Results   Component Value Date    MAXINE 9.2 06/12/2017     Lab Results   Component Value Date    CO2 23 06/12/2017     Lab Results   Component Value Date    BUN 17 06/12/2017     Lab Results   Component Value Date    CR 1.10 06/12/2017     Lab Results   Component Value Date     04/02/2019     Lab Results   Component Value Date    WBC 5.1 06/12/2017     Lab Results   Component Value Date    RBC 4.25 06/12/2017     Lab Results   Component Value Date    HGB 13.3 06/12/2017     Lab Results   Component Value Date    HCT 37.9 06/12/2017     Lab Results   Component Value Date    MCV 89.2 06/12/2017     Lab Results   Component Value Date    MCH 31.3 06/12/2017     Lab Results   Component Value Date    MCHC 35.1 06/12/2017     Lab Results   Component Value Date    RDW 13.90 06/12/2017     Lab Results   Component Value Date     06/12/2017       Thank you for allowing me to participate in the care of your patient.    Sincerely,     Nikita Gayle MD     [facility]

## 2020-03-17 ENCOUNTER — TELEPHONE (OUTPATIENT)
Dept: FAMILY MEDICINE | Facility: CLINIC | Age: 65
End: 2020-03-17

## 2020-03-17 ENCOUNTER — HOSPITAL ENCOUNTER (OUTPATIENT)
Dept: CARDIOLOGY | Facility: CLINIC | Age: 65
Discharge: HOME | End: 2020-03-17
Attending: INTERNAL MEDICINE | Admitting: INTERNAL MEDICINE
Payer: OTHER GOVERNMENT

## 2020-03-17 ENCOUNTER — VIRTUAL VISIT (OUTPATIENT)
Dept: FAMILY MEDICINE | Facility: CLINIC | Age: 65
End: 2020-03-17
Payer: OTHER GOVERNMENT

## 2020-03-17 DIAGNOSIS — I71.21 THORACIC ASCENDING AORTIC ANEURYSM (H): ICD-10-CM

## 2020-03-17 DIAGNOSIS — E06.3 HYPOTHYROIDISM DUE TO HASHIMOTO'S THYROIDITIS: ICD-10-CM

## 2020-03-17 DIAGNOSIS — Z00.00 EXAMINATION: Primary | ICD-10-CM

## 2020-03-17 DIAGNOSIS — I10 BENIGN ESSENTIAL HYPERTENSION: ICD-10-CM

## 2020-03-17 PROCEDURE — 93306 TTE W/DOPPLER COMPLETE: CPT | Mod: 26 | Performed by: INTERNAL MEDICINE

## 2020-03-17 PROCEDURE — 93306 TTE W/DOPPLER COMPLETE: CPT

## 2020-03-17 PROCEDURE — 84443 ASSAY THYROID STIM HORMONE: CPT | Performed by: FAMILY MEDICINE

## 2020-03-17 PROCEDURE — 36415 COLL VENOUS BLD VENIPUNCTURE: CPT | Performed by: FAMILY MEDICINE

## 2020-03-17 PROCEDURE — 99442 ZZC PHYSICIAN TELEPHONE EVALUATION 11-20 MIN: CPT | Performed by: FAMILY MEDICINE

## 2020-03-17 PROCEDURE — 80048 BASIC METABOLIC PNL TOTAL CA: CPT | Performed by: FAMILY MEDICINE

## 2020-03-17 RX ORDER — LEVOTHYROXINE SODIUM 137 UG/1
137 TABLET ORAL DAILY
Qty: 90 TABLET | Refills: 3 | Status: SHIPPED | OUTPATIENT
Start: 2020-03-17 | End: 2020-12-11

## 2020-03-17 RX ORDER — NIFEDIPINE 60 MG/1
60 TABLET, EXTENDED RELEASE ORAL DAILY
Qty: 90 TABLET | Refills: 3 | Status: SHIPPED | OUTPATIENT
Start: 2020-03-17 | End: 2020-12-11

## 2020-03-17 NOTE — PROGRESS NOTES
"Simba Fuentes is a 64 year old male who is being evaluated via a billable telephone visit.      The patient has been notified of following:     \"This telephone visit will be conducted via a call between you and your physician/provider. We have found that certain health care needs can be provided without the need for a physical exam.  This service lets us provide the care you need with a short phone conversation.  If a prescription is necessary we can send it directly to your pharmacy.  If lab work is needed we can place an order for that and you can then stop by our lab to have the test done at a later time.    If during the course of the call the physician/provider feels a telephone visit is not appropriate, you will not be charged for this service.\"       Simba Fuentes complains of    Chief Complaint   Patient presents with     Telephone       I have reviewed and updated the patient's Past Medical History, Social History, Family History and Medication List.    ALLERGIES  Chlorhexidine    Itzel Au MA     (MA signature)    Additional provider notes:   Hypothyroidism Follow-up      Since last visit, patient describes the following symptoms: weight gain of 10 lbs  Hypertension Follow-up      Do you check your blood pressure regularly outside of the clinic? No     Are you following a low salt diet? No    Are your blood pressures ever more than 140 on the top number (systolic) OR more   than 90 on the bottom number (diastolic), for example 140/90? n/a          Assessment/Plan:  (E03.8,  E06.3) Hypothyroidism due to Hashimoto's thyroiditis  Comment: stable, encourage weight loss, check   Plan: TSH with free T4 reflex, levothyroxine         (SYNTHROID) 137 MCG tablet            (I10) Benign essential hypertension  Comment: controlled, BP checked yesterday  Plan: Basic metabolic panel, NIFEdipine ER OSMOTIC         (PROCARDIA XL) 60 MG 24 hr tablet          Pt will be moving to [place], and may establish there, " but he may come back occasionally here too.    I have reviewed the note as documented above.  This accurately captures the substance of my conversation with the patient.      Phone call contact time  Call Started at 10:48.  Call Ended at 11:01    Kvng Malhotra MD

## 2020-03-17 NOTE — TELEPHONE ENCOUNTER
RN HONEY on VM to have patient return call to clinic to discuss rescheduling preventative visit that is scheduled for today for after July     If patient needs refills change to phone visit now     Priya Bennett Registered Nurse   [facility]

## 2020-03-17 NOTE — LETTER
March 18, 2020      Simba Fuentes  [address] [place] [id number]        Dear ,    We are writing to inform you of your test results.  All your labs are within normal including  Basic profile and thyroid test.       Resulted Orders   TSH with free T4 reflex   Result Value Ref Range    TSH 1.06 0.40 - 4.00 mU/L   Basic metabolic panel   Result Value Ref Range    Sodium 136 133 - 144 mmol/L    Potassium 4.1 3.4 - 5.3 mmol/L    Chloride 107 94 - 109 mmol/L    Carbon Dioxide 24 20 - 32 mmol/L    Anion Gap 5 3 - 14 mmol/L    Glucose 98 70 - 99 mg/dL      Comment:      Fasting specimen    Urea Nitrogen 13 7 - 30 mg/dL    Creatinine 0.89 0.66 - 1.25 mg/dL    GFR Estimate >90 >60 mL/min/[1.73_m2]      Comment:      Non  GFR Calc  Starting 12/18/2018, serum creatinine based estimated GFR (eGFR) will be   calculated using the Chronic Kidney Disease Epidemiology Collaboration   (CKD-EPI) equation.      GFR Estimate If Black >90 >60 mL/min/[1.73_m2]      Comment:       GFR Calc  Starting 12/18/2018, serum creatinine based estimated GFR (eGFR) will be   calculated using the Chronic Kidney Disease Epidemiology Collaboration   (CKD-EPI) equation.      Calcium 8.5 8.5 - 10.1 mg/dL       If you have any questions or concerns, please call the clinic at the number listed above.       Sincerely,        Kvng Malhotra MD

## 2020-03-18 LAB
ANION GAP SERPL CALCULATED.3IONS-SCNC: 5 MMOL/L (ref 3–14)
BUN SERPL-MCNC: 13 MG/DL (ref 7–30)
CALCIUM SERPL-MCNC: 8.5 MG/DL (ref 8.5–10.1)
CHLORIDE SERPL-SCNC: 107 MMOL/L (ref 94–109)
CO2 SERPL-SCNC: 24 MMOL/L (ref 20–32)
CREAT SERPL-MCNC: 0.89 MG/DL (ref 0.66–1.25)
GFR SERPL CREATININE-BSD FRML MDRD: >90 ML/MIN/{1.73_M2}
GLUCOSE SERPL-MCNC: 98 MG/DL (ref 70–99)
POTASSIUM SERPL-SCNC: 4.1 MMOL/L (ref 3.4–5.3)
SODIUM SERPL-SCNC: 136 MMOL/L (ref 133–144)
TSH SERPL DL<=0.005 MIU/L-ACNC: 1.06 MU/L (ref 0.4–4)

## 2020-04-14 ENCOUNTER — CARE COORDINATION (OUTPATIENT)
Dept: CARDIOLOGY | Facility: CLINIC | Age: 65
End: 2020-04-14

## 2020-04-14 NOTE — PROGRESS NOTES
The aorta is unchanged in size (4.7cm). We can check it again in another year or so. Hope he's staying safe and doing well. -Dr. Gayle      Called patient to let him know results of Echocardiogram results. Patient confirmed understanding of this. Will call heart clinic if he experiences symptoms or has questions.         LIZ Alonso April 14, 2020 1:26 PM

## 2020-11-12 ENCOUNTER — TELEPHONE (OUTPATIENT)
Dept: FAMILY MEDICINE | Facility: CLINIC | Age: 65
End: 2020-11-12

## 2020-11-12 NOTE — TELEPHONE ENCOUNTER
General Call:     Who is calling:  Patient     Reason for Call:  Pt called to schedule medication review appt.  He will be in [place] from Dec 10-13, and has a phone appt scheduled for 12/11.    What are your questions or concerns:  Pt is wondering if he will need labs and if so if he can have the orders placed so he can get that scheduled while he is in town.     Date of last appointment with provider: 03/17/2020    Okay to leave a detailed message:Yes at Cell number on file:    Telephone Information:   Mobile [phone]

## 2020-12-11 ENCOUNTER — OFFICE VISIT (OUTPATIENT)
Dept: FAMILY MEDICINE | Facility: CLINIC | Age: 65
End: 2020-12-11
Payer: OTHER GOVERNMENT

## 2020-12-11 VITALS
TEMPERATURE: 98.2 F | HEART RATE: 68 BPM | RESPIRATION RATE: 16 BRPM | DIASTOLIC BLOOD PRESSURE: 81 MMHG | SYSTOLIC BLOOD PRESSURE: 121 MMHG | OXYGEN SATURATION: 98 % | WEIGHT: 292.8 LBS | BODY MASS INDEX: 39.71 KG/M2

## 2020-12-11 DIAGNOSIS — B36.0 TINEA VERSICOLOR: ICD-10-CM

## 2020-12-11 DIAGNOSIS — I49.9 IRREGULAR HEART BEAT: ICD-10-CM

## 2020-12-11 DIAGNOSIS — Z12.11 SPECIAL SCREENING FOR MALIGNANT NEOPLASMS, COLON: ICD-10-CM

## 2020-12-11 DIAGNOSIS — E06.3 HYPOTHYROIDISM DUE TO HASHIMOTO'S THYROIDITIS: ICD-10-CM

## 2020-12-11 DIAGNOSIS — E66.01 MORBID OBESITY (H): ICD-10-CM

## 2020-12-11 DIAGNOSIS — C85.10 HIGH GRADE B-CELL LYMPHOMA (H): ICD-10-CM

## 2020-12-11 DIAGNOSIS — Z11.4 SCREENING FOR HIV (HUMAN IMMUNODEFICIENCY VIRUS): Primary | ICD-10-CM

## 2020-12-11 DIAGNOSIS — I10 BENIGN ESSENTIAL HYPERTENSION: ICD-10-CM

## 2020-12-11 LAB
ANION GAP SERPL CALCULATED.3IONS-SCNC: 6 MMOL/L (ref 3–14)
BUN SERPL-MCNC: 18 MG/DL (ref 7–30)
CALCIUM SERPL-MCNC: 8.6 MG/DL (ref 8.5–10.1)
CHLORIDE SERPL-SCNC: 107 MMOL/L (ref 94–109)
CO2 SERPL-SCNC: 24 MMOL/L (ref 20–32)
CREAT SERPL-MCNC: 0.94 MG/DL (ref 0.66–1.25)
ERYTHROCYTE [DISTWIDTH] IN BLOOD BY AUTOMATED COUNT: 14.4 % (ref 10–15)
GFR SERPL CREATININE-BSD FRML MDRD: 85 ML/MIN/{1.73_M2}
GLUCOSE SERPL-MCNC: 97 MG/DL (ref 70–99)
HCT VFR BLD AUTO: 39.8 % (ref 40–53)
HGB BLD-MCNC: 13.5 G/DL (ref 13.3–17.7)
MCH RBC QN AUTO: 31.3 PG (ref 26.5–33)
MCHC RBC AUTO-ENTMCNC: 33.9 G/DL (ref 31.5–36.5)
MCV RBC AUTO: 92 FL (ref 78–100)
PLATELET # BLD AUTO: 227 10E9/L (ref 150–450)
POTASSIUM SERPL-SCNC: 3.9 MMOL/L (ref 3.4–5.3)
RBC # BLD AUTO: 4.31 10E12/L (ref 4.4–5.9)
SODIUM SERPL-SCNC: 137 MMOL/L (ref 133–144)
WBC # BLD AUTO: 6.4 10E9/L (ref 4–11)

## 2020-12-11 PROCEDURE — 99214 OFFICE O/P EST MOD 30 MIN: CPT | Performed by: FAMILY MEDICINE

## 2020-12-11 PROCEDURE — 93000 ELECTROCARDIOGRAM COMPLETE: CPT | Performed by: FAMILY MEDICINE

## 2020-12-11 PROCEDURE — 85027 COMPLETE CBC AUTOMATED: CPT | Performed by: FAMILY MEDICINE

## 2020-12-11 PROCEDURE — 87389 HIV-1 AG W/HIV-1&-2 AB AG IA: CPT | Performed by: FAMILY MEDICINE

## 2020-12-11 PROCEDURE — 36415 COLL VENOUS BLD VENIPUNCTURE: CPT | Performed by: FAMILY MEDICINE

## 2020-12-11 PROCEDURE — 80048 BASIC METABOLIC PNL TOTAL CA: CPT | Performed by: FAMILY MEDICINE

## 2020-12-11 RX ORDER — NIFEDIPINE 60 MG/1
60 TABLET, EXTENDED RELEASE ORAL DAILY
Qty: 90 TABLET | Refills: 3 | Status: SHIPPED | OUTPATIENT
Start: 2020-12-11 | End: 2021-12-17

## 2020-12-11 RX ORDER — LEVOTHYROXINE SODIUM 137 UG/1
137 TABLET ORAL DAILY
Qty: 90 TABLET | Refills: 3 | Status: SHIPPED | OUTPATIENT
Start: 2020-12-11 | End: 2021-12-17

## 2020-12-11 RX ORDER — KETOCONAZOLE 20 MG/G
CREAM TOPICAL 2 TIMES DAILY
Qty: 60 G | Refills: 3 | Status: SHIPPED | OUTPATIENT
Start: 2020-12-11 | End: 2021-11-15

## 2020-12-11 NOTE — PROGRESS NOTES
Subjective     Simba Fuentes is a 65 year old male who presents to clinic today for the following health issues:    History of Present Illness       He eats 0-1 servings of fruits and vegetables daily.He consumes 1 sweetened beverage(s) daily.He exercises with enough effort to increase his heart rate 10 to 19 minutes per day.  He exercises with enough effort to increase his heart rate 5 days per week.   He is taking medications regularly.           Hypertension Follow-up      Do you check your blood pressure regularly outside of the clinic? Yes     Are you following a low salt diet? Yes    Are your blood pressures ever more than 140 on the top number (systolic) OR more   than 90 on the bottom number (diastolic), for example 140/90? No    Hypothyroidism Follow-up      Since last visit, patient describes the following symptoms: weight gain of 20 lbs, dry skin and fatigue      X3 weeks patient stated that on his left ear he thinks it started as a pimple and now it is big and won't go down.    Review of Systems   CONSTITUTIONAL:some weight gain from last visit.  ENT/MOUTH: NEGATIVE for ear, mouth and throat problems  RESP: NEGATIVE for significant cough or SOB  CV: NEGATIVE for chest pain, palpitations or peripheral edema      Objective    /81 (BP Location: Right arm, Patient Position: Sitting, Cuff Size: Adult Large)   Pulse 68   Temp 98.2  F (36.8  C) (Oral)   Resp 16   Wt 132.8 kg (292 lb 12.8 oz)   SpO2 98%   BMI 39.71 kg/m    Body mass index is 39.71 kg/m .  Physical Exam   GENERAL: healthy, alert and no distress  NECK: no adenopathy, no asymmetry, masses, or scars and thyroid normal to palpation  RESP: lungs clear to auscultation - no rales, rhonchi or wheezes  CV: irregular rhythm, but no murmur, click or rub, peripheral pulses strong and no peripheral edema  ABDOMEN: soft, nontender, no hepatosplenomegaly, no masses and bowel sounds normal  MS: no gross musculoskeletal defects noted, no  edema    EKG - appears normal, NSR, Right Bundle Branch Block, multiple ectopic which explain the irregular rhythm        Assessment & Plan     Screening for HIV (human immunodeficiency virus)    - HIV Antigen Antibody Combo    Benign essential hypertension  Controlled, continue on   - NIFEdipine ER OSMOTIC (PROCARDIA XL) 60 MG 24 hr tablet; Take 1 tablet (60 mg) by mouth daily    Hypothyroidism due to Hashimoto's thyroiditis  Controlled, given the weight gain, check TSH again.  - levothyroxine (SYNTHROID) 137 MCG tablet; Take 1 tablet (137 mcg) by mouth daily    Tinea versicolor  May use  - ketoconazole (NIZORAL) 2 % external cream; Apply topically 2 times daily    High grade B-cell lymphoma (H)  In remission, check   - CBC with platelets    Morbid obesity (H)  Today I counseled the patient about diet, regular exercise and weight loss planning.      Irregular heart beat  Related to multiple ectopics talked about cutting down caffeine.  - EKG 12-lead complete w/read - Clinics  - Basic metabolic panel  (Ca, Cl, CO2, Creat, Gluc, K, Na, BUN)    Special screening for malignant neoplasms, colon    - Fecal colorectal cancer screen (FIT); Future     BMI:   Estimated body mass index is 39.71 kg/m  as calculated from the following:    Height as of 3/16/20: 1.829 m (6').    Weight as of this encounter: 132.8 kg (292 lb 12.8 oz).   Weight management plan: Discussed healthy diet and exercise guidelines             Return in about 1 year (around 12/11/2021) for Routine physical..    Kvng Malhotra MD  [facility]

## 2020-12-11 NOTE — LETTER
December 14, 2020      Simba Fuentes  [address] [place] [address]        Dear Simba,    We are writing to inform you of your test results.    Your test results fall within the expected range(s) or remain unchanged from previous results.  Please continue with current treatment plan.    Resulted Orders   HIV Antigen Antibody Combo   Result Value Ref Range    HIV Antigen Antibody Combo Nonreactive NR^Nonreactive          Comment:      HIV-1 p24 Ag & HIV-1/HIV-2 Ab Not Detected   Basic metabolic panel  (Ca, Cl, CO2, Creat, Gluc, K, Na, BUN)   Result Value Ref Range    Sodium 137 133 - 144 mmol/L    Potassium 3.9 3.4 - 5.3 mmol/L    Chloride 107 94 - 109 mmol/L    Carbon Dioxide 24 20 - 32 mmol/L    Anion Gap 6 3 - 14 mmol/L    Glucose 97 70 - 99 mg/dL    Urea Nitrogen 18 7 - 30 mg/dL    Creatinine 0.94 0.66 - 1.25 mg/dL    GFR Estimate 85 >60 mL/min/[1.73_m2]      Comment:      Non  GFR Calc  Starting 12/18/2018, serum creatinine based estimated GFR (eGFR) will be   calculated using the Chronic Kidney Disease Epidemiology Collaboration   (CKD-EPI) equation.      GFR Estimate If Black >90 >60 mL/min/[1.73_m2]      Comment:       GFR Calc  Starting 12/18/2018, serum creatinine based estimated GFR (eGFR) will be   calculated using the Chronic Kidney Disease Epidemiology Collaboration   (CKD-EPI) equation.      Calcium 8.6 8.5 - 10.1 mg/dL   CBC with platelets   Result Value Ref Range    WBC 6.4 4.0 - 11.0 10e9/L    RBC Count 4.31 (L) 4.4 - 5.9 10e12/L      Comment:      Results confirmed by repeat test    Hemoglobin 13.5 13.3 - 17.7 g/dL    Hematocrit 39.8 (L) 40.0 - 53.0 %      Comment:      Results confirmed by repeat test    MCV 92 78 - 100 fl    MCH 31.3 26.5 - 33.0 pg    MCHC 33.9 31.5 - 36.5 g/dL    RDW 14.4 10.0 - 15.0 %    Platelet Count 227 150 - 450 10e9/L       If you have any questions or concerns, please call the clinic at the number listed above.        Sincerely,      Kvng Malhotra MD

## 2020-12-13 LAB — HIV 1+2 AB+HIV1 P24 AG SERPL QL IA: NONREACTIVE

## 2021-11-15 ENCOUNTER — OFFICE VISIT (OUTPATIENT)
Dept: FAMILY MEDICINE | Facility: CLINIC | Age: 66
End: 2021-11-15
Payer: OTHER GOVERNMENT

## 2021-11-15 ENCOUNTER — TELEPHONE (OUTPATIENT)
Dept: FAMILY MEDICINE | Facility: CLINIC | Age: 66
End: 2021-11-15

## 2021-11-15 VITALS
HEART RATE: 56 BPM | RESPIRATION RATE: 16 BRPM | WEIGHT: 303.8 LBS | SYSTOLIC BLOOD PRESSURE: 114 MMHG | BODY MASS INDEX: 41.15 KG/M2 | HEIGHT: 72 IN | DIASTOLIC BLOOD PRESSURE: 82 MMHG | OXYGEN SATURATION: 96 % | TEMPERATURE: 97.9 F

## 2021-11-15 DIAGNOSIS — E66.01 MORBID OBESITY (H): ICD-10-CM

## 2021-11-15 DIAGNOSIS — C85.10 HIGH GRADE B-CELL LYMPHOMA (H): ICD-10-CM

## 2021-11-15 DIAGNOSIS — I10 BENIGN ESSENTIAL HYPERTENSION: Primary | ICD-10-CM

## 2021-11-15 DIAGNOSIS — E06.3 HYPOTHYROIDISM DUE TO HASHIMOTO'S THYROIDITIS: ICD-10-CM

## 2021-11-15 DIAGNOSIS — I71.21 THORACIC ASCENDING AORTIC ANEURYSM (H): ICD-10-CM

## 2021-11-15 DIAGNOSIS — Z12.11 SPECIAL SCREENING FOR MALIGNANT NEOPLASMS, COLON: ICD-10-CM

## 2021-11-15 LAB
ANION GAP SERPL CALCULATED.3IONS-SCNC: 5 MMOL/L (ref 3–14)
BUN SERPL-MCNC: 14 MG/DL (ref 7–30)
CALCIUM SERPL-MCNC: 9.1 MG/DL (ref 8.5–10.1)
CHLORIDE BLD-SCNC: 107 MMOL/L (ref 94–109)
CHOLEST SERPL-MCNC: 197 MG/DL
CO2 SERPL-SCNC: 26 MMOL/L (ref 20–32)
CREAT SERPL-MCNC: 0.98 MG/DL (ref 0.66–1.25)
ERYTHROCYTE [DISTWIDTH] IN BLOOD BY AUTOMATED COUNT: 14.1 % (ref 10–15)
FASTING STATUS PATIENT QL REPORTED: YES
GFR SERPL CREATININE-BSD FRML MDRD: 81 ML/MIN/1.73M2
GLUCOSE BLD-MCNC: 113 MG/DL (ref 70–99)
HCT VFR BLD AUTO: 39.7 % (ref 40–53)
HDLC SERPL-MCNC: 44 MG/DL
HGB BLD-MCNC: 13.5 G/DL (ref 13.3–17.7)
LDLC SERPL CALC-MCNC: 138 MG/DL
MCH RBC QN AUTO: 31.5 PG (ref 26.5–33)
MCHC RBC AUTO-ENTMCNC: 34 G/DL (ref 31.5–36.5)
MCV RBC AUTO: 93 FL (ref 78–100)
NONHDLC SERPL-MCNC: 153 MG/DL
PLATELET # BLD AUTO: 230 10E3/UL (ref 150–450)
POTASSIUM BLD-SCNC: 4.2 MMOL/L (ref 3.4–5.3)
RBC # BLD AUTO: 4.28 10E6/UL (ref 4.4–5.9)
SODIUM SERPL-SCNC: 138 MMOL/L (ref 133–144)
TRIGL SERPL-MCNC: 74 MG/DL
TSH SERPL DL<=0.005 MIU/L-ACNC: 1.18 MU/L (ref 0.4–4)
WBC # BLD AUTO: 5.5 10E3/UL (ref 4–11)

## 2021-11-15 PROCEDURE — 99214 OFFICE O/P EST MOD 30 MIN: CPT | Performed by: FAMILY MEDICINE

## 2021-11-15 PROCEDURE — 80048 BASIC METABOLIC PNL TOTAL CA: CPT | Performed by: FAMILY MEDICINE

## 2021-11-15 PROCEDURE — 84443 ASSAY THYROID STIM HORMONE: CPT | Performed by: FAMILY MEDICINE

## 2021-11-15 PROCEDURE — 85027 COMPLETE CBC AUTOMATED: CPT | Performed by: FAMILY MEDICINE

## 2021-11-15 PROCEDURE — 36415 COLL VENOUS BLD VENIPUNCTURE: CPT | Performed by: FAMILY MEDICINE

## 2021-11-15 PROCEDURE — 80061 LIPID PANEL: CPT | Performed by: FAMILY MEDICINE

## 2021-11-15 ASSESSMENT — MIFFLIN-ST. JEOR: SCORE: 2197.06

## 2021-11-15 NOTE — LETTER
November 17, 2021      Simba Fuentes  [address] [place] [address]        Dear Simba,     Here is a copy of your labs and Dr. Malhotra's comment.      Kvng Malhotra MD  AA  9:25 PM  Note  I wonder if we can send a letter for patient's results to him.  But I would like a phone call to inform him that his blood sugar is high, and he needs to focus on diet and exercise, and weight loss, then we can recheck with him in 6 months when he comes back in May to recheck his blood sugar, (preferable an office visit then)  Letter: all labs are within normal except for high blood sugar.  Kvng Malhotra MD  [facility]  [phone]        Sincerely,        Adrienne Villaseñor RN                                  Component      Latest Ref Rng & Units 11/15/2021   Sodium      133 - 144 mmol/L 138   Potassium      3.4 - 5.3 mmol/L 4.2   Chloride      94 - 109 mmol/L 107   Carbon Dioxide      20 - 32 mmol/L 26   Anion Gap      3 - 14 mmol/L 5   Urea Nitrogen      7 - 30 mg/dL 14   Creatinine      0.66 - 1.25 mg/dL 0.98   Calcium      8.5 - 10.1 mg/dL 9.1   Glucose      70 - 99 mg/dL 113 (H)   GFR Estimate      >60 mL/min/1.73m2 81   WBC      4.0 - 11.0 10e3/uL 5.5   RBC Count      4.40 - 5.90 10e6/uL 4.28 (L)   Hemoglobin      13.3 - 17.7 g/dL 13.5   Hematocrit      40.0 - 53.0 % 39.7 (L)   MCV      78 - 100 fL 93   MCH      26.5 - 33.0 pg 31.5   MCHC      31.5 - 36.5 g/dL 34.0   RDW      10.0 - 15.0 % 14.1   Platelet Count      150 - 450 10e3/uL 230   Cholesterol      <200 mg/dL 197   Triglycerides      <150 mg/dL 74   HDL Cholesterol      >=40 mg/dL 44   LDL Cholesterol Calculated      <=100 mg/dL 138 (H)   Non HDL Cholesterol      <130 mg/dL 153 (H)   Patient Fasting > 8hrs?       Yes   TSH      0.40 - 4.00 mU/L 1.18

## 2021-11-15 NOTE — PROGRESS NOTES
"  Assessment & Plan     High grade B-cell lymphoma (H)    - CBC with platelets; Future  - CBC with platelets    Thoracic ascending aortic aneurysm (H)  Pt did not do his Echo for this year, and he is leaving for [place] tomorrow, but he will be coming back in May, so will schedule his Echo in May, advised to get a physician in [place] if possible.    Morbid obesity (H)  Today I counseled the patient about diet, regular exercise and weight loss planning.  Pt is highly motivated.     Benign essential hypertension  Controlled, continue on   - Basic metabolic panel  (Ca, Cl, CO2, Creat, Gluc, K, Na, BUN); Future  - Lipid panel reflex to direct LDL Fasting; Future  - Basic metabolic panel  (Ca, Cl, CO2, Creat, Gluc, K, Na, BUN)  - Lipid panel reflex to direct LDL Fasting    Hypothyroidism due to Hashimoto's thyroiditis  Controlled, check TSH and adjust accordingly.   - TSH with free T4 reflex; Future  - TSH with free T4 reflex    Special screening for malignant neoplasms, colon    - Fecal colorectal cancer screen (FIT); Future  - Fecal colorectal cancer screen (FIT)             BMI:   Estimated body mass index is 41.49 kg/m  as calculated from the following:    Height as of this encounter: 1.822 m (5' 11.75\").    Weight as of this encounter: 137.8 kg (303 lb 12.8 oz).   Weight management plan: Discussed healthy diet and exercise guidelines        Return in about 1 year (around 11/15/2022) for Routine physical..    Kvng Malhotra MD  [facility] MAGDALENO Cottrell is a 66 year old who presents for the following health issues     HPI     Hypertension Follow-up      Do you check your blood pressure regularly outside of the clinic? No     Are you following a low salt diet? Yes    Are your blood pressures ever more than 140 on the top number (systolic) OR more   than 90 on the bottom number (diastolic), for example 140/90? No    Hypothyroidism Follow-up      Since last visit, patient describes the following " "symptoms: Weight stable( a little gain from knee injury this summer because of limited movement), no hair loss, no skin changes, no constipation, no loose stools      How many servings of fruits and vegetables do you eat daily?  0-1    On average, how many sweetened beverages do you drink each day (Examples: soda, juice, sweet tea, etc.  Do NOT count diet or artificially sweetened beverages)?   2    How many days per week do you exercise enough to make your heart beat faster? 4    How many minutes a day do you exercise enough to make your heart beat faster? 30 - 60    How many days per week do you miss taking your medication? 0    Declined Vaccines  Pt did have questions about COVID vaccine, and pnuemonia vaccine, he is still undecided.   Strongly recommend COVID vaccine, given his risk factors.  Review of Systems   CONSTITUTIONAL:weight gain.  RESP: NEGATIVE for significant cough or SOB  CV: NEGATIVE for chest pain, palpitations or peripheral edema      Objective    /82 (BP Location: Right arm, Patient Position: Sitting, Cuff Size: Adult Large)   Pulse 56   Temp 97.9  F (36.6  C) (Oral)   Resp 16   Ht 1.822 m (5' 11.75\")   Wt 137.8 kg (303 lb 12.8 oz)   SpO2 96%   BMI 41.49 kg/m    Body mass index is 41.49 kg/m .  Physical Exam   GENERAL: healthy, alert and no distress  NECK: no adenopathy, no asymmetry, masses, or scars and thyroid normal to palpation  RESP: lungs clear to auscultation - no rales, rhonchi or wheezes  CV: regular rate and rhythm, normal S1 S2, no S3 or S4, no murmur, click or rub,and peripheral pulses strong  ABDOMEN: obese, non tender.  MS:no pitting edmea in both legs.                 "

## 2021-11-16 NOTE — TELEPHONE ENCOUNTER
I wonder if we can send a letter for patient's results to him.  But I would like a phone call to inform him that his blood sugar is high, and he needs to focus on diet and exercise, and weight loss, then we can recheck with him in 6 months when he comes back in May to recheck his blood sugar, (preferable an office visit then)  Letter: all labs are within normal except for high blood sugar.  Kvng Malhotra MD  [facility]  [phone]

## 2021-11-16 NOTE — TELEPHONE ENCOUNTER
Call to patient. Left message asking patient to return the call any triage nurse.  Ivett Vergara RN

## 2021-11-27 ENCOUNTER — HEALTH MAINTENANCE LETTER (OUTPATIENT)
Age: 66
End: 2021-11-27

## 2021-12-15 DIAGNOSIS — I10 BENIGN ESSENTIAL HYPERTENSION: ICD-10-CM

## 2021-12-15 DIAGNOSIS — E06.3 HYPOTHYROIDISM DUE TO HASHIMOTO'S THYROIDITIS: ICD-10-CM

## 2021-12-17 RX ORDER — LEVOTHYROXINE SODIUM 137 MCG
TABLET ORAL
Qty: 90 TABLET | Refills: 3 | Status: SHIPPED | OUTPATIENT
Start: 2021-12-17

## 2021-12-17 RX ORDER — NIFEDIPINE 60 MG/1
TABLET, EXTENDED RELEASE ORAL
Qty: 90 TABLET | Refills: 1 | Status: SHIPPED | OUTPATIENT
Start: 2021-12-17 | End: 2022-06-03

## 2021-12-17 NOTE — TELEPHONE ENCOUNTER
Prescription approved per [facility] Refill Protocol.  Ivett Vergara RN       TSH   Date Value Ref Range Status   11/15/2021 1.18 0.40 - 4.00 mU/L Final   03/17/2020 1.06 0.40 - 4.00 mU/L Final

## 2022-01-22 ENCOUNTER — HEALTH MAINTENANCE LETTER (OUTPATIENT)
Age: 67
End: 2022-01-22

## 2022-03-28 ENCOUNTER — MYC MEDICAL ADVICE (OUTPATIENT)
Dept: FAMILY MEDICINE | Facility: CLINIC | Age: 67
End: 2022-03-28
Payer: OTHER GOVERNMENT

## 2022-04-11 ENCOUNTER — TELEPHONE (OUTPATIENT)
Dept: FAMILY MEDICINE | Facility: CLINIC | Age: 67
End: 2022-04-11
Payer: OTHER GOVERNMENT

## 2022-04-11 DIAGNOSIS — I77.89 ASCENDING AORTA ENLARGEMENT (H): Primary | ICD-10-CM

## 2022-06-02 DIAGNOSIS — I10 BENIGN ESSENTIAL HYPERTENSION: ICD-10-CM

## 2022-06-03 RX ORDER — NIFEDIPINE 60 MG/1
TABLET, EXTENDED RELEASE ORAL
Qty: 90 TABLET | Refills: 1 | Status: SHIPPED | OUTPATIENT
Start: 2022-06-03

## 2022-08-04 ENCOUNTER — TELEPHONE (OUTPATIENT)
Dept: FAMILY MEDICINE | Facility: CLINIC | Age: 67
End: 2022-08-04

## 2022-08-04 NOTE — TELEPHONE ENCOUNTER
Patient Quality Outreach    Patient is due for the following:   Colon Cancer Screening  Physical Annual Wellness Visit      Topic Date Due     COVID-19 Vaccine (1) Never done     Pneumococcal Vaccine (1 - PCV) Never done     Zoster (Shingles) Vaccine (1 of 2) Never done     Diptheria Tetanus Pertussis (DTAP/TDAP/TD) Vaccine (1 - Tdap) Never done       Next Steps:   Schedule a yearly physical    Type of outreach:    Sent [employer] message. and Sent letter.    Next Steps:  Reach out within 90 days via Phone.    Max number of attempts reached: No. Will try again in 90 days if patient still on fail list.    Questions for provider review:    None     Juani Maravilla MA

## 2023-01-14 ENCOUNTER — HEALTH MAINTENANCE LETTER (OUTPATIENT)
Age: 68
End: 2023-01-14